# Patient Record
Sex: MALE | Race: WHITE | Employment: STUDENT | URBAN - METROPOLITAN AREA
[De-identification: names, ages, dates, MRNs, and addresses within clinical notes are randomized per-mention and may not be internally consistent; named-entity substitution may affect disease eponyms.]

---

## 2018-02-25 ENCOUNTER — HOSPITAL ENCOUNTER (EMERGENCY)
Age: 23
Discharge: HOME | End: 2018-02-26
Attending: EMERGENCY MEDICINE
Payer: COMMERCIAL

## 2018-02-25 DIAGNOSIS — T44.5X1A ACCIDENTAL INJECTION OF EPINEPHRINE, INITIAL ENCOUNTER: ICD-10-CM

## 2018-02-25 DIAGNOSIS — T78.40XA ACUTE ALLERGIC REACTION, INITIAL ENCOUNTER: Primary | ICD-10-CM

## 2018-02-25 PROCEDURE — 99283 EMERGENCY DEPT VISIT LOW MDM: CPT

## 2018-02-25 PROCEDURE — 6370000000 HC RX 637 (ALT 250 FOR IP): Performed by: EMERGENCY MEDICINE

## 2018-02-25 PROCEDURE — 6360000002 HC RX W HCPCS: Performed by: EMERGENCY MEDICINE

## 2018-02-25 RX ORDER — ONDANSETRON 4 MG/1
4 TABLET, ORALLY DISINTEGRATING ORAL ONCE
Status: COMPLETED | OUTPATIENT
Start: 2018-02-25 | End: 2018-02-25

## 2018-02-25 RX ORDER — ONDANSETRON 4 MG/1
4 TABLET, ORALLY DISINTEGRATING ORAL ONCE
Status: DISCONTINUED | OUTPATIENT
Start: 2018-02-25 | End: 2018-02-25

## 2018-02-25 RX ORDER — EPINEPHRINE 0.3 MG/.3ML
0.3 INJECTION SUBCUTANEOUS ONCE
Qty: 0.3 ML | Refills: 2 | Status: SHIPPED | OUTPATIENT
Start: 2018-02-26 | End: 2021-10-15

## 2018-02-25 RX ADMIN — ONDANSETRON 4 MG: 4 TABLET, ORALLY DISINTEGRATING ORAL at 23:49

## 2018-02-25 RX ADMIN — NITROGLYCERIN 0.5 INCH: 20 OINTMENT TOPICAL at 23:49

## 2018-02-25 RX ADMIN — ONDANSETRON 4 MG: 4 TABLET, ORALLY DISINTEGRATING ORAL at 21:33

## 2018-02-25 ASSESSMENT — ENCOUNTER SYMPTOMS
VOMITING: 0
ABDOMINAL PAIN: 0
SORE THROAT: 0
NAUSEA: 0
DIARRHEA: 0
SHORTNESS OF BREATH: 0
BACK PAIN: 0

## 2018-02-26 VITALS
BODY MASS INDEX: 23.49 KG/M2 | SYSTOLIC BLOOD PRESSURE: 117 MMHG | HEIGHT: 68 IN | OXYGEN SATURATION: 100 % | RESPIRATION RATE: 18 BRPM | WEIGHT: 155 LBS | TEMPERATURE: 98.6 F | DIASTOLIC BLOOD PRESSURE: 77 MMHG | HEART RATE: 78 BPM

## 2018-02-26 RX ORDER — ONDANSETRON 4 MG/1
4 TABLET, ORALLY DISINTEGRATING ORAL EVERY 8 HOURS PRN
Qty: 12 TABLET | Refills: 0 | Status: SHIPPED | OUTPATIENT
Start: 2018-02-26 | End: 2022-03-29

## 2018-02-26 NOTE — ED PROVIDER NOTES
[address] [place] [address] ED  eMERGENCY dEPARTMENT eNCOUnter      Pt Name: Sabrina Thomas  MRN: [id number]  [place] 1995  Date of evaluation: 2/25/2018  Provider: Allie Martino MD     CHIEF COMPLAINT       Chief Complaint   Patient presents with    Allergic Reaction       HISTORY OF PRESENT ILLNESS   (Location/Symptom, Timing/Onset, Context/Setting, Quality, Duration, Modifying Factors, Severity) Note limiting factors. 65-year-old male with a known peanut allergy presents after accidentally exposure to peanuts. He was eating a cookie that he thought was nut free but unfortunately it did contain peanuts. He felt almost immediately short of breath and had throat tightness. He self-administered his EpiPen and his symptoms completely resolved prior to paramedic arrival.  No complaints on arrival except for mild nausea which she states is not unusual when he has an allergic reaction. He denies any shortness of breath or trouble swallowing at this time. Of note, he did accidentally injected the epinephrine into his right thumb when he was taking the cap off of the device. He is right-hand dominant. He denies paresthesias or coldness in the thumb. Nursing Notes were reviewed. REVIEW OF SYSTEMS    (2+ for level 4; 10+ for level 5)     Review of Systems   Constitutional: Negative for chills and fever. HENT: Negative for sore throat. Eyes: Negative for visual disturbance. Respiratory: Negative for shortness of breath. Cardiovascular: Negative for chest pain. Gastrointestinal: Negative for abdominal pain, diarrhea, nausea and vomiting. Endocrine: Negative for polyuria. Genitourinary: Negative for dysuria. Musculoskeletal: Negative for back pain and neck pain. Skin: Negative for rash. Neurological: Negative for dizziness, weakness and headaches. Hematological: Negative for adenopathy. Psychiatric/Behavioral: Negative for confusion.    All other systems reviewed and are comments: In terms of his allergic reaction, his symptoms have resolved but I will observe him for several hours in case he has recurrence. I will also refill his EpiPen. In terms of his accidental injection of the EpiPen into his thumb, he really has no symptoms related to it at this time. I did have him soak his thumb and hand in a basin of fairly warm water and we continue to refill it with warm water and also use warm blankets for several hours. He continues to have no symptoms. Nitroglycerin was applied after discussion with him and he was observed further. He is still asymptomatic. He has cap refill of less than 3 seconds on several serial examinations. I told him that there is still a chance of complications however given his well appearance, the likelihood is low. He is very reliable and wants to go home. He assures me that he will continue to warm his finger at home using a heating pad and come back if he has any symptoms. CRITICAL CARE TIME     Total Critical Care time (not applicable if blank)      Total minutes, excluding separately reportable procedures. There was a high probability of clinically significant/life threatening deterioration in the patient's condition which required my urgent intervention. This includes discussing the case with consultants, reviewing laboratory studies and images independently, arranging disposition, and speaking with patient/family    CONSULTS:  None    PROCEDURES:  Unless otherwise noted below, none     Procedures    FINAL IMPRESSION      1. Acute allergic reaction, initial encounter    2. Accidental injection of epinephrine, initial encounter          DISPOSITION/PLAN   DISPOSITION Decision To Discharge 02/25/2018 11:34:19 PM      PATIENT REFERRED TO:  No follow-up provider specified.     DISCHARGE MEDICATIONS:  Current Discharge Medication List             (Please note that portions of this note were completed with a voice recognition program.

## 2019-07-06 ENCOUNTER — HOSPITAL ENCOUNTER (EMERGENCY)
Age: 24
Discharge: HOME | End: 2019-07-06
Payer: COMMERCIAL

## 2019-07-06 VITALS
OXYGEN SATURATION: 98 % | SYSTOLIC BLOOD PRESSURE: 132 MMHG | RESPIRATION RATE: 14 BRPM | HEIGHT: 68 IN | TEMPERATURE: 98.3 F | BODY MASS INDEX: 23.49 KG/M2 | WEIGHT: 155 LBS | HEART RATE: 72 BPM | DIASTOLIC BLOOD PRESSURE: 76 MMHG

## 2019-07-06 DIAGNOSIS — L03.213 PRESEPTAL CELLULITIS OF RIGHT EYE: Primary | ICD-10-CM

## 2019-07-06 PROCEDURE — 2500000003 HC RX 250 WO HCPCS: Performed by: PHYSICIAN ASSISTANT

## 2019-07-06 PROCEDURE — 96365 THER/PROPH/DIAG IV INF INIT: CPT

## 2019-07-06 PROCEDURE — 2580000003 HC RX 258

## 2019-07-06 PROCEDURE — 99282 EMERGENCY DEPT VISIT SF MDM: CPT

## 2019-07-06 RX ORDER — CLINDAMYCIN HYDROCHLORIDE 150 MG/1
450 CAPSULE ORAL 3 TIMES DAILY
Qty: 63 CAPSULE | Refills: 0 | Status: SHIPPED | OUTPATIENT
Start: 2019-07-06 | End: 2019-07-13

## 2019-07-06 RX ORDER — CLINDAMYCIN PHOSPHATE 600 MG/50ML
600 INJECTION INTRAVENOUS ONCE
Status: COMPLETED | OUTPATIENT
Start: 2019-07-06 | End: 2019-07-06

## 2019-07-06 RX ORDER — NAPROXEN 500 MG/1
500 TABLET ORAL 2 TIMES DAILY
Qty: 60 TABLET | Refills: 0 | Status: SHIPPED | OUTPATIENT
Start: 2019-07-06 | End: 2022-03-29

## 2019-07-06 RX ORDER — SODIUM CHLORIDE 0.9 % (FLUSH) 0.9 %
SYRINGE (ML) INJECTION
Status: COMPLETED
Start: 2019-07-06 | End: 2019-07-06

## 2019-07-06 RX ADMIN — CLINDAMYCIN PHOSPHATE 600 MG: 600 INJECTION, SOLUTION INTRAVENOUS at 11:21

## 2019-07-06 RX ADMIN — Medication 10 ML: at 11:21

## 2019-07-06 ASSESSMENT — PAIN DESCRIPTION - PROGRESSION: CLINICAL_PROGRESSION: GRADUALLY WORSENING

## 2019-07-06 ASSESSMENT — PAIN DESCRIPTION - ORIENTATION: ORIENTATION: RIGHT

## 2019-07-06 ASSESSMENT — PAIN DESCRIPTION - DESCRIPTORS: DESCRIPTORS: THROBBING

## 2019-07-06 ASSESSMENT — PAIN DESCRIPTION - PAIN TYPE: TYPE: ACUTE PAIN

## 2019-07-06 ASSESSMENT — PAIN DESCRIPTION - FREQUENCY: FREQUENCY: CONTINUOUS

## 2019-07-06 ASSESSMENT — PAIN DESCRIPTION - LOCATION: LOCATION: EYE

## 2019-07-06 NOTE — ED PROVIDER NOTES
pain or swelling. He was also advised to return if he develops pain with eye movement or vision changes. He was educated on signs and symptoms of infection that would require emergent return to the ED. He was educated on importance of antibiotic use and finishing the whole prescription as long as he has no adverse side effects. He will follow-up with his family doctor on Monday. I discussed the differential, results and discharge plan with the patient and/or family/friend/caregiver if present. I emphasized the importance of follow-up with the physician I referred them to in the timeframe recommended. I explained reasons for the patient to return to the Emergency Department. Additional verbal discharge instructions were also given and discussed with the patient to supplement those generated by the EMR. We also discussed medications that were prescribed (if any) including common side effects and interactions. The patient was advised to abstain from driving, operating heavy machinery or making significant decisions while taking medications such as opiates and muscle relaxers that may impair this. All questions were addressed. They understand return precautions and discharge instructions. The patient and/or family/friend/caregiver expressed understanding. Vitals were stable and they were in no distress at discharge. Ice to right eye if needed for swelling. Patient also advised to try to keep his head elevated during sleep to help with swelling. Assessment      1. Preseptal cellulitis of right eye      This patient's ED course included: a personal history and physicial examination and IV medications  This patient has remained hemodynamically stable during their ED course. Plan   Discharge to home. Patient condition is good.     New Medications     Discharge Medication List as of 7/6/2019 11:07 AM      START taking these medications    Details   clindamycin (CLEOCIN) 150 MG capsule Take 3 capsules by mouth 3 times daily for 7 days, Disp-63 capsule, R-0Print      naproxen (NAPROSYN) 500 MG tablet Take 1 tablet by mouth 2 times daily, Disp-60 tablet, R-0Print           Electronically signed by Donnie Sam PA-C   DD: 7/6/19  **This report was transcribed using voice recognition software. Every effort was made to ensure accuracy; however, inadvertent computerized transcription errors may be present.     END OF PROVIDER NOTE        Donnie Sam PA-C  07/06/19 4907

## 2020-07-08 ENCOUNTER — APPOINTMENT (OUTPATIENT)
Dept: CT IMAGING | Age: 25
End: 2020-07-08
Payer: COMMERCIAL

## 2020-07-08 ENCOUNTER — APPOINTMENT (OUTPATIENT)
Dept: GENERAL RADIOLOGY | Age: 25
End: 2020-07-08
Payer: COMMERCIAL

## 2020-07-08 ENCOUNTER — HOSPITAL ENCOUNTER (EMERGENCY)
Age: 25
Discharge: HOME | End: 2020-07-08
Attending: EMERGENCY MEDICINE
Payer: COMMERCIAL

## 2020-07-08 VITALS
WEIGHT: 145 LBS | SYSTOLIC BLOOD PRESSURE: 120 MMHG | HEIGHT: 68 IN | RESPIRATION RATE: 18 BRPM | BODY MASS INDEX: 21.98 KG/M2 | HEART RATE: 68 BPM | OXYGEN SATURATION: 98 % | TEMPERATURE: 98.5 F | DIASTOLIC BLOOD PRESSURE: 79 MMHG

## 2020-07-08 LAB
ABO/RH: NORMAL
ANION GAP SERPL CALCULATED.3IONS-SCNC: 15 MEQ/L (ref 9–15)
ANTIBODY SCREEN: NORMAL
APTT: 33.1 SEC (ref 24.4–36.8)
BASOPHILS ABSOLUTE: 0.1 K/UL (ref 0–0.2)
BASOPHILS RELATIVE PERCENT: 1.3 %
BUN BLDV-MCNC: 9 MG/DL (ref 6–20)
CALCIUM SERPL-MCNC: 9.5 MG/DL (ref 8.5–9.9)
CHLORIDE BLD-SCNC: 102 MEQ/L (ref 95–107)
CO2: 24 MEQ/L (ref 20–31)
CREAT SERPL-MCNC: 0.77 MG/DL (ref 0.7–1.2)
EOSINOPHILS ABSOLUTE: 0.3 K/UL (ref 0–0.7)
EOSINOPHILS RELATIVE PERCENT: 4.2 %
ETHANOL PERCENT: 0.08 G/DL
ETHANOL: 88 MG/DL (ref 0–0.08)
GFR AFRICAN AMERICAN: >60
GFR NON-AFRICAN AMERICAN: >60
GLUCOSE BLD-MCNC: 105 MG/DL (ref 70–99)
HCT VFR BLD CALC: 49.8 % (ref 42–52)
HEMOGLOBIN: 16.6 G/DL (ref 14–18)
INR BLD: 1.1
LYMPHOCYTES ABSOLUTE: 3.4 K/UL (ref 1–4.8)
LYMPHOCYTES RELATIVE PERCENT: 44.3 %
MCH RBC QN AUTO: 31.2 PG (ref 27–31.3)
MCHC RBC AUTO-ENTMCNC: 33.3 % (ref 33–37)
MCV RBC AUTO: 93.6 FL (ref 80–100)
MONOCYTES ABSOLUTE: 0.7 K/UL (ref 0.2–0.8)
MONOCYTES RELATIVE PERCENT: 8.5 %
NEUTROPHILS ABSOLUTE: 3.2 K/UL (ref 1.4–6.5)
NEUTROPHILS RELATIVE PERCENT: 41.7 %
PDW BLD-RTO: 13.8 % (ref 11.5–14.5)
PLATELET # BLD: 290 K/UL (ref 130–400)
POTASSIUM SERPL-SCNC: 4 MEQ/L (ref 3.4–4.9)
PROTHROMBIN TIME: 13.9 SEC (ref 12.3–14.9)
RBC # BLD: 5.32 M/UL (ref 4.7–6.1)
SODIUM BLD-SCNC: 141 MEQ/L (ref 135–144)
WBC # BLD: 7.7 K/UL (ref 4.8–10.8)

## 2020-07-08 PROCEDURE — 80048 BASIC METABOLIC PNL TOTAL CA: CPT

## 2020-07-08 PROCEDURE — 85025 COMPLETE CBC W/AUTO DIFF WBC: CPT

## 2020-07-08 PROCEDURE — 86900 BLOOD TYPING SEROLOGIC ABO: CPT

## 2020-07-08 PROCEDURE — 29125 APPL SHORT ARM SPLINT STATIC: CPT

## 2020-07-08 PROCEDURE — 85610 PROTHROMBIN TIME: CPT

## 2020-07-08 PROCEDURE — 73110 X-RAY EXAM OF WRIST: CPT

## 2020-07-08 PROCEDURE — 86901 BLOOD TYPING SEROLOGIC RH(D): CPT

## 2020-07-08 PROCEDURE — 6360000002 HC RX W HCPCS: Performed by: PERSONAL EMERGENCY RESPONSE ATTENDANT

## 2020-07-08 PROCEDURE — 6830039000 HC L3 TRAUMA ALERT

## 2020-07-08 PROCEDURE — G0480 DRUG TEST DEF 1-7 CLASSES: HCPCS

## 2020-07-08 PROCEDURE — 96374 THER/PROPH/DIAG INJ IV PUSH: CPT

## 2020-07-08 PROCEDURE — 86850 RBC ANTIBODY SCREEN: CPT

## 2020-07-08 PROCEDURE — 99284 EMERGENCY DEPT VISIT MOD MDM: CPT

## 2020-07-08 PROCEDURE — 73552 X-RAY EXAM OF FEMUR 2/>: CPT

## 2020-07-08 PROCEDURE — 70450 CT HEAD/BRAIN W/O DYE: CPT

## 2020-07-08 PROCEDURE — 96375 TX/PRO/DX INJ NEW DRUG ADDON: CPT

## 2020-07-08 PROCEDURE — 85730 THROMBOPLASTIN TIME PARTIAL: CPT

## 2020-07-08 PROCEDURE — 36415 COLL VENOUS BLD VENIPUNCTURE: CPT

## 2020-07-08 RX ORDER — FENTANYL CITRATE 50 UG/ML
50 INJECTION, SOLUTION INTRAMUSCULAR; INTRAVENOUS ONCE
Status: COMPLETED | OUTPATIENT
Start: 2020-07-08 | End: 2020-07-08

## 2020-07-08 RX ORDER — ONDANSETRON 2 MG/ML
4 INJECTION INTRAMUSCULAR; INTRAVENOUS ONCE
Status: COMPLETED | OUTPATIENT
Start: 2020-07-08 | End: 2020-07-08

## 2020-07-08 RX ORDER — IBUPROFEN 800 MG/1
800 TABLET ORAL EVERY 8 HOURS PRN
Qty: 30 TABLET | Refills: 0 | Status: SHIPPED | OUTPATIENT
Start: 2020-07-08 | End: 2022-03-29

## 2020-07-08 RX ORDER — HYDROCODONE BITARTRATE AND ACETAMINOPHEN 5; 325 MG/1; MG/1
1-2 TABLET ORAL EVERY 6 HOURS PRN
Qty: 10 TABLET | Refills: 0 | Status: SHIPPED | OUTPATIENT
Start: 2020-07-08 | End: 2020-07-15

## 2020-07-08 RX ADMIN — ONDANSETRON 4 MG: 2 INJECTION INTRAMUSCULAR; INTRAVENOUS at 01:58

## 2020-07-08 RX ADMIN — FENTANYL CITRATE 50 MCG: 50 INJECTION, SOLUTION INTRAMUSCULAR; INTRAVENOUS at 01:58

## 2020-07-08 ASSESSMENT — PAIN DESCRIPTION - ORIENTATION: ORIENTATION: RIGHT

## 2020-07-08 ASSESSMENT — PAIN DESCRIPTION - PAIN TYPE: TYPE: ACUTE PAIN

## 2020-07-08 ASSESSMENT — PAIN DESCRIPTION - LOCATION: LOCATION: WRIST

## 2020-07-08 ASSESSMENT — ENCOUNTER SYMPTOMS
SHORTNESS OF BREATH: 0
BLOOD IN STOOL: 0
RHINORRHEA: 0
ABDOMINAL PAIN: 0
SORE THROAT: 0
COUGH: 0
NAUSEA: 0
VOMITING: 0
DIARRHEA: 0
COLOR CHANGE: 0

## 2020-07-08 ASSESSMENT — PAIN DESCRIPTION - FREQUENCY: FREQUENCY: CONTINUOUS

## 2020-07-08 ASSESSMENT — PAIN SCALES - GENERAL
PAINLEVEL_OUTOF10: 3
PAINLEVEL_OUTOF10: 5
PAINLEVEL_OUTOF10: 5

## 2020-07-08 ASSESSMENT — PAIN DESCRIPTION - PROGRESSION: CLINICAL_PROGRESSION: GRADUALLY IMPROVING

## 2020-07-08 ASSESSMENT — PAIN DESCRIPTION - DESCRIPTORS: DESCRIPTORS: ACHING;SHARP

## 2020-07-08 NOTE — ED NOTES
Patient given discharge instructions and prescriptions and verbalized understanding. Vital signs stable. Resp even and unlabored. Skin warm, dry and intact. Patient is alert and oriented. IV removed. Patient doesn't have any questions at this time.         Agusto Stevens RN  07/08/20 2895

## 2020-07-08 NOTE — ED NOTES
Patient returned to room from CT. Patient resting in bed with call light in reach. Breathes are even and unlabored. Skin is warm and dry. Vital signs are stable. Patient denies any needs at this time.       Arun Carlisle RN  07/08/20 0907
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
## 2020-07-08 NOTE — ED NOTES
Sugar tong splint applied to right arm with assistance from Dolly Holter, RN. Patient tolerated well.       Titus Hancock RN  07/08/20 4538

## 2020-07-09 ENCOUNTER — OFFICE VISIT (OUTPATIENT)
Dept: ORTHOPEDIC SURGERY | Age: 25
End: 2020-07-09
Payer: COMMERCIAL

## 2020-07-09 VITALS — HEIGHT: 68 IN | BODY MASS INDEX: 21.98 KG/M2 | TEMPERATURE: 96.2 F | WEIGHT: 145 LBS

## 2020-07-09 PROBLEM — S52.531A FRACTURE, COLLES, RIGHT, CLOSED: Status: ACTIVE | Noted: 2020-07-09

## 2020-07-09 PROCEDURE — 99202 OFFICE O/P NEW SF 15 MIN: CPT | Performed by: ORTHOPAEDIC SURGERY

## 2020-07-09 PROCEDURE — 25600 CLTX DST RDL FX/EPHYS SEP WO: CPT | Performed by: ORTHOPAEDIC SURGERY
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
## 2020-07-30 ENCOUNTER — NURSE ONLY (OUTPATIENT)
Dept: ORTHOPEDIC SURGERY | Age: 25
End: 2020-07-30

## 2020-07-30 NOTE — PROGRESS NOTES
Patient came in to office with really loose cast. Cast removed without difficulty. Patient placed in new short arm cast without difficulty. Cast care explained. Patient has a f/u with Dr. Fernando Channel 08/14.

## 2020-08-13 ENCOUNTER — OFFICE VISIT (OUTPATIENT)
Dept: ORTHOPEDIC SURGERY | Age: 25
End: 2020-08-13
Payer: COMMERCIAL

## 2020-08-13 ENCOUNTER — HOSPITAL ENCOUNTER (OUTPATIENT)
Dept: ORTHOPEDIC SURGERY | Age: 25
Discharge: HOME | End: 2020-08-15
Payer: COMMERCIAL

## 2020-08-13 VITALS
TEMPERATURE: 97.2 F | HEIGHT: 68 IN | WEIGHT: 145 LBS | BODY MASS INDEX: 21.98 KG/M2 | OXYGEN SATURATION: 97 % | HEART RATE: 76 BPM

## 2020-08-13 PROCEDURE — 73110 X-RAY EXAM OF WRIST: CPT | Performed by: ORTHOPAEDIC SURGERY

## 2020-08-13 PROCEDURE — L3908 WHO COCK-UP NONMOLDE PRE OTS: HCPCS | Performed by: PHYSICIAN ASSISTANT

## 2020-08-13 PROCEDURE — 73110 X-RAY EXAM OF WRIST: CPT

## 2020-08-13 PROCEDURE — 99024 POSTOP FOLLOW-UP VISIT: CPT | Performed by: PHYSICIAN ASSISTANT

## 2020-08-13 ASSESSMENT — ENCOUNTER SYMPTOMS
CONSTIPATION: 0
COLOR CHANGE: 0
DIARRHEA: 0
NAUSEA: 0
BACK PAIN: 0
VOMITING: 0
STRIDOR: 0
SHORTNESS OF BREATH: 0
WHEEZING: 0

## 2020-08-13 NOTE — PROGRESS NOTES
[facility]      Subjective:      Chief Complaint   Patient presents with    Follow-up      [facility] F/U FOR Closed Colles' fracture of right radius, xrays out of cast in office today; pt says that he has a no pain but does experience some tenderness if he turns it a certain way       HPI: Kelly Runner is a right-hand-dominant 27 y.o. male who is about a month after suffering distal radial fracture of the right wrist.  She was in a cast which was removed for x-ray to assess healing. No residual numbness, however there is some stiffness. Past Medical History:   Diagnosis Date    Asthma       History reviewed. No pertinent surgical history.   Social History     Socioeconomic History    Marital status: Single     Spouse name: Not on file    Number of children: Not on file    Years of education: Not on file    Highest education level: Not on file   Occupational History    Not on file   Social Needs    Financial resource strain: Not on file    Food insecurity     Worry: Not on file     Inability: Not on file    Transportation needs     Medical: Not on file     Non-medical: Not on file   Tobacco Use    Smoking status: Never Smoker    Smokeless tobacco: Never Used   Substance and Sexual Activity    Alcohol use: No    Drug use: Yes     Types: Marijuana     Comment: social    Sexual activity: Not on file   Lifestyle    Physical activity     Days per week: Not on file     Minutes per session: Not on file    Stress: Not on file   Relationships    Social connections     Talks on phone: Not on file     Gets together: Not on file     Attends Orthodox service: Not on file     Active member of club or organization: Not on file     Attends meetings of clubs or organizations: Not on file     Relationship status: Not on file    Intimate partner violence     Fear of current or ex partner: Not on file     Emotionally abused: Not on file     Physically abused: Not on file     Forced sexual activity: Not on file   Other Topics Concern    Not on file   Social History Narrative    Not on file     History reviewed. No pertinent family history. Allergies   Allergen Reactions    Nut [Peanut-Containing Drug Products]      Current Outpatient Medications on File Prior to Visit   Medication Sig Dispense Refill    ibuprofen (ADVIL;MOTRIN) 800 MG tablet Take 1 tablet by mouth every 8 hours as needed for Pain 30 tablet 0    naproxen (NAPROSYN) 500 MG tablet Take 1 tablet by mouth 2 times daily 60 tablet 0    ondansetron (ZOFRAN ODT) 4 MG disintegrating tablet Take 1 tablet by mouth every 8 hours as needed for Nausea or Vomiting 12 tablet 0    EPINEPHrine (EPIPEN 2-NANCY) 0.3 MG/0.3ML SOAJ injection Inject 0.3 mLs into the muscle once for 1 dose Use as directed for allergic reaction 0.3 mL 2    montelukast (SINGULAIR) 10 MG tablet Take 10 mg by mouth nightly      albuterol sulfate  (90 BASE) MCG/ACT inhaler Inhale 2 puffs into the lungs every 6 hours as needed for Wheezing       No current facility-administered medications on file prior to visit. Review of Systems   Constitutional: Negative for appetite change and fever. Respiratory: Negative for shortness of breath, wheezing and stridor. Cardiovascular: Negative for chest pain and palpitations. Gastrointestinal: Negative for constipation, diarrhea, nausea and vomiting. Musculoskeletal: Negative for arthralgias, back pain, joint swelling, myalgias and neck pain. Skin: Negative for color change and rash. Neurological: Negative for dizziness, speech difficulty, weakness and light-headedness.        Objective:   Pulse 76   Temp 97.2 °F (36.2 °C) (Temporal)   Ht 5' 8\" (1.727 m)   Wt 145 lb (65.8 kg)   SpO2 97%   BMI 22.05 kg/m²     General: WDWN, well appearing, in no distress   Skin: without breakdown, rash, normal in color    Right Hand Exam   Right hand exam is normal.    Tenderness   The patient is experiencing no tenderness. Range of Motion   The patient has normal right wrist ROM. Muscle Strength   The patient has normal right wrist strength. Tests   Phalens Sign: negative  Tinel's sign (median nerve): negative  Finkelstein's test: negative    Other   Erythema: absent  Scars: absent  Sensation: normal  Pulse: present    Comments:  No residual stiffness      Left Hand Exam   Left hand exam is normal.    Tenderness   The patient is experiencing no tenderness. Range of Motion   The patient has normal left wrist ROM. Muscle Strength   The patient has normal left wrist strength. Tests   Phalens Sign: negative  Tinel's sign (median nerve): negative  Finkelstein's test: negative    Other   Erythema: absent  Scars: absent  Sensation: normal  Pulse: present              Radiographs and Laboratory Studies:     Diagnostic Imaging Studies:    Xr Wrist Right (min 3 Views)    Result Date: 8/13/2020  AP lateral and some oblique views taken of the right distal radius show the fracture fragments have not moved the fracture line itself has not widened and there is no visible sign of collapse mall angulation or displacement. Laboratory Studies:   Lab Results   Component Value Date    WBC 7.7 07/08/2020    HGB 16.6 07/08/2020    HCT 49.8 07/08/2020    MCV 93.6 07/08/2020     07/08/2020     No results found for: SEDRATE  No results found for: CRP    Assessment and Plan:      Diagnosis Orders   1. Closed Colles' fracture of right radius with routine healing, subsequent encounter  XR WRIST RIGHT (MIN 3 VIEWS)    XR WRIST RIGHT (MIN 3 VIEWS)     Very minimal residual stiffness. There is no pain to palpation over the distal radius. He was offered an XO's brace is that he cannot pay for it, he will get a cock-up brace for that. He is instructed to wear it in a most times especially with activity not to lift anything incredibly heavy.   Slowly wean herself out of the brace but time we see you next in a month, we will get an x-ray then to     The above plan was discussed in thorough detail with Dr. Nick Avitia and the patient. Orders Placed This Encounter   Procedures    XR WRIST RIGHT (MIN 3 VIEWS)     Standing Status:   Future     Standing Expiration Date:   8/13/2021    XR WRIST RIGHT (MIN 3 VIEWS)     Standing Status:   Future     Standing Expiration Date:   8/13/2021     No orders of the defined types were placed in this encounter. Return in about 4 weeks (around 9/10/2020), or colles fx f/u, for Evaluate fracture.     Tai Pretty PA-C  [facility] [id number] [facility]  [phone]

## 2021-10-15 ENCOUNTER — HOSPITAL ENCOUNTER (EMERGENCY)
Age: 26
Discharge: HOME | End: 2021-10-15
Attending: EMERGENCY MEDICINE
Payer: COMMERCIAL

## 2021-10-15 VITALS
HEIGHT: 68 IN | TEMPERATURE: 98.7 F | OXYGEN SATURATION: 99 % | RESPIRATION RATE: 17 BRPM | HEART RATE: 82 BPM | WEIGHT: 155 LBS | DIASTOLIC BLOOD PRESSURE: 62 MMHG | SYSTOLIC BLOOD PRESSURE: 109 MMHG | BODY MASS INDEX: 23.49 KG/M2

## 2021-10-15 DIAGNOSIS — T78.2XXA ANAPHYLAXIS, INITIAL ENCOUNTER: Primary | ICD-10-CM

## 2021-10-15 PROCEDURE — 99284 EMERGENCY DEPT VISIT MOD MDM: CPT

## 2021-10-15 PROCEDURE — 6360000002 HC RX W HCPCS: Performed by: EMERGENCY MEDICINE

## 2021-10-15 PROCEDURE — 96375 TX/PRO/DX INJ NEW DRUG ADDON: CPT

## 2021-10-15 PROCEDURE — 96374 THER/PROPH/DIAG INJ IV PUSH: CPT

## 2021-10-15 PROCEDURE — 2580000003 HC RX 258: Performed by: EMERGENCY MEDICINE

## 2021-10-15 PROCEDURE — 2500000003 HC RX 250 WO HCPCS: Performed by: EMERGENCY MEDICINE

## 2021-10-15 RX ORDER — DIPHENHYDRAMINE HCL 25 MG
50 CAPSULE ORAL EVERY 6 HOURS PRN
Qty: 20 CAPSULE | Refills: 0 | Status: SHIPPED | OUTPATIENT
Start: 2021-10-15 | End: 2022-03-29 | Stop reason: SDUPTHER

## 2021-10-15 RX ORDER — 0.9 % SODIUM CHLORIDE 0.9 %
1000 INTRAVENOUS SOLUTION INTRAVENOUS ONCE
Status: COMPLETED | OUTPATIENT
Start: 2021-10-15 | End: 2021-10-15

## 2021-10-15 RX ORDER — METHYLPREDNISOLONE SODIUM SUCCINATE 125 MG/2ML
125 INJECTION, POWDER, LYOPHILIZED, FOR SOLUTION INTRAMUSCULAR; INTRAVENOUS ONCE
Status: COMPLETED | OUTPATIENT
Start: 2021-10-15 | End: 2021-10-15

## 2021-10-15 RX ORDER — PREDNISONE 20 MG/1
40 TABLET ORAL DAILY
Qty: 10 TABLET | Refills: 0 | Status: SHIPPED | OUTPATIENT
Start: 2021-10-15 | End: 2021-10-20

## 2021-10-15 RX ORDER — DIPHENHYDRAMINE HYDROCHLORIDE 50 MG/ML
50 INJECTION INTRAMUSCULAR; INTRAVENOUS ONCE
Status: COMPLETED | OUTPATIENT
Start: 2021-10-15 | End: 2021-10-15

## 2021-10-15 RX ORDER — EPINEPHRINE 0.3 MG/.3ML
0.3 INJECTION SUBCUTANEOUS ONCE
Qty: 1 EACH | Refills: 1 | Status: SHIPPED | OUTPATIENT
Start: 2021-10-15 | End: 2021-10-15

## 2021-10-15 RX ORDER — SODIUM CHLORIDE 0.9 % (FLUSH) 0.9 %
3 SYRINGE (ML) INJECTION EVERY 8 HOURS
Status: DISCONTINUED | OUTPATIENT
Start: 2021-10-15 | End: 2021-10-15 | Stop reason: HOSPADM

## 2021-10-15 RX ADMIN — DIPHENHYDRAMINE HYDROCHLORIDE 50 MG: 50 INJECTION INTRAMUSCULAR; INTRAVENOUS at 19:39

## 2021-10-15 RX ADMIN — Medication 3 ML: at 19:40

## 2021-10-15 RX ADMIN — SODIUM CHLORIDE 1000 ML: 9 INJECTION, SOLUTION INTRAVENOUS at 19:38

## 2021-10-15 RX ADMIN — METHYLPREDNISOLONE SODIUM SUCCINATE 125 MG: 125 INJECTION, POWDER, FOR SOLUTION INTRAMUSCULAR; INTRAVENOUS at 19:38

## 2021-10-15 RX ADMIN — FAMOTIDINE 20 MG: 10 INJECTION, SOLUTION INTRAVENOUS at 19:39

## 2021-10-15 ASSESSMENT — ENCOUNTER SYMPTOMS
FACIAL SWELLING: 0
VOMITING: 0
BACK PAIN: 0
STRIDOR: 0
ABDOMINAL PAIN: 0
COUGH: 0
EYE REDNESS: 0
SORE THROAT: 0
EYE PAIN: 0
SHORTNESS OF BREATH: 0
EYE DISCHARGE: 0
SINUS PRESSURE: 0
CHOKING: 0
BLOOD IN STOOL: 0
VOICE CHANGE: 0
TROUBLE SWALLOWING: 0
CONSTIPATION: 0
WHEEZING: 0
DIARRHEA: 0
CHEST TIGHTNESS: 0

## 2021-10-15 ASSESSMENT — PAIN DESCRIPTION - LOCATION: LOCATION: ABDOMEN

## 2021-10-15 ASSESSMENT — PAIN DESCRIPTION - FREQUENCY: FREQUENCY: CONTINUOUS

## 2021-10-15 ASSESSMENT — PAIN SCALES - GENERAL: PAINLEVEL_OUTOF10: 2

## 2021-10-15 ASSESSMENT — PAIN DESCRIPTION - DESCRIPTORS: DESCRIPTORS: DISCOMFORT

## 2021-10-15 ASSESSMENT — PAIN DESCRIPTION - PAIN TYPE: TYPE: ACUTE PAIN

## 2021-10-15 ASSESSMENT — PAIN DESCRIPTION - ONSET: ONSET: SUDDEN

## 2021-10-15 NOTE — ED PROVIDER NOTES
[address] [place] [address] ED  eMERGENCY dEPARTMENT eNCOUnter      Pt Name: Chet Pedro  MRN: [id number]  [place] 1995  Date of evaluation: 10/15/2021  Provider: Joann Reece MD    [address] [place] [address]       Chief Complaint   Patient presents with    Allergic Reaction     used 2 epi pens PTA         HISTORY OF PRESENT ILLNESS   (Location/Symptom, Timing/Onset,Context/Setting, Quality, Duration, Modifying Factors, Severity)  Note limiting factors. Chet Pedro is a 27 y.o. male who presents to the emergency department patient with a history of peanut allergy accidentally he has food containing peanuts in the [employer] and immediately break out into hives all over the body patient immediately took EpiPen with minimal relief came here no swelling of the tongue or lips no short of breath itching and rash all over the body    HPI    NursingNotes were reviewed. REVIEW OF SYSTEMS    (2-9 systems for level 4, 10 or more for level 5)     Review of Systems   Constitutional: Negative. Negative for activity change and fever. HENT: Negative for congestion, drooling, facial swelling, mouth sores, nosebleeds, sinus pressure, sore throat, trouble swallowing and voice change. Eyes: Negative for pain, discharge, redness and visual disturbance. Respiratory: Negative for cough, choking, chest tightness, shortness of breath, wheezing and stridor. Cardiovascular: Negative for chest pain, palpitations and leg swelling. Gastrointestinal: Negative for abdominal pain, blood in stool, constipation, diarrhea and vomiting. Endocrine: Negative for cold intolerance, polyphagia and polyuria. Genitourinary: Negative for dysuria, flank pain, frequency, genital sores and urgency. Musculoskeletal: Negative for back pain, joint swelling, neck pain and neck stiffness. Skin: Positive for rash. Negative for pallor. Neurological: Negative for tremors, seizures, syncope, weakness, numbness and headaches.    Hematological: Negative for adenopathy. Does not bruise/bleed easily. Psychiatric/Behavioral: Negative for agitation, behavioral problems, hallucinations and sleep disturbance. The patient is nervous/anxious. The patient is not hyperactive. All other systems reviewed and are negative. Except as noted above the remainder of the review of systems was reviewed and negative. PAST MEDICAL HISTORY     Past Medical History:   Diagnosis Date    Asthma          SURGICALHISTORY     History reviewed. No pertinent surgical history. CURRENT MEDICATIONS       Previous Medications    ALBUTEROL SULFATE  (90 BASE) MCG/ACT INHALER    Inhale 2 puffs into the lungs every 6 hours as needed for Wheezing    IBUPROFEN (ADVIL;MOTRIN) 800 MG TABLET    Take 1 tablet by mouth every 8 hours as needed for Pain    MONTELUKAST (SINGULAIR) 10 MG TABLET    Take 10 mg by mouth nightly    NAPROXEN (NAPROSYN) 500 MG TABLET    Take 1 tablet by mouth 2 times daily    ONDANSETRON (ZOFRAN ODT) 4 MG DISINTEGRATING TABLET    Take 1 tablet by mouth every 8 hours as needed for Nausea or Vomiting       ALLERGIES     Nut [peanut-containing drug products]    FAMILY HISTORY     History reviewed. No pertinent family history.        SOCIAL HISTORY       Social History     Socioeconomic History    Marital status: Single     Spouse name: None    Number of children: None    Years of education: None    Highest education level: None   Occupational History    None   Tobacco Use    Smoking status: Never Smoker    Smokeless tobacco: Never Used   Vaping Use    Vaping Use: Former   Substance and Sexual Activity    Alcohol use: No    Drug use: Not Currently     Types: Marijuana     Comment: social    Sexual activity: None   Other Topics Concern    None   Social History Narrative    None     Social Determinants of Health     Financial Resource Strain:     Difficulty of Paying Living Expenses:    Food Insecurity:     Worried About Running Out of Food in the Last Year:    [address] in the Last Year:    Transportation Needs:     Lack of Transportation (Medical):  Lack of Transportation (Non-Medical):    Physical Activity:     Days of Exercise per Week:     Minutes of Exercise per Session:    Stress:     Feeling of Stress :    Social Connections:     Frequency of Communication with Friends and Family:     Frequency of Social Gatherings with Friends and Family:     Attends Anglican Services:     Active Member of Clubs or Organizations:     Attends Club or Organization Meetings:     Marital Status:    Intimate Partner Violence:     Fear of Current or Ex-Partner:     Emotionally Abused:     Physically Abused:     Sexually Abused:        SCREENINGS    Gautam Coma Scale  Eye Opening: Spontaneous  Best Verbal Response: Oriented  Best Motor Response: Obeys commands  Gautam Coma Scale Score: 15 @FLOW(32270429)@      PHYSICAL EXAM    (up to 7 for level 4, 8 or more for level 5)     ED Triage Vitals [10/15/21 1924]   BP Temp Temp Source Pulse Resp SpO2 Height Weight   123/84 98.7 °F (37.1 °C) Oral 113 18 97 % 5' 8\" (1.727 m) 155 lb (70.3 kg)       Physical Exam  Vitals and nursing note reviewed. Constitutional:       General: He is not in acute distress. Appearance: He is not ill-appearing, toxic-appearing or diaphoretic. Comments: At alert cooperative patient talks in full sentences slightly uncomfortable because the rash and itching all over   HENT:      Head: Atraumatic. Right Ear: Tympanic membrane, ear canal and external ear normal.      Left Ear: Tympanic membrane, ear canal and external ear normal.      Nose: Nose normal.      Mouth/Throat:      Mouth: Mucous membranes are moist.      Pharynx: No oropharyngeal exudate or posterior oropharyngeal erythema.       Comments: Attention come to the face and to the mouth patient has no angioedema no swelling of the tongue no swelling of the lips has no respiratory compromise  Eyes: General:         Right eye: No discharge. Left eye: No discharge. Extraocular Movements: Extraocular movements intact. Pupils: Pupils are equal, round, and reactive to light. Neck:      Vascular: No carotid bruit. Cardiovascular:      Rate and Rhythm: Normal rate and regular rhythm. Pulses: Normal pulses. Heart sounds: Normal heart sounds. No murmur heard. No friction rub. No gallop. Pulmonary:      Effort: No respiratory distress. Breath sounds: No stridor. No wheezing, rhonchi or rales. Chest:      Chest wall: No tenderness. Abdominal:      General: Abdomen is flat. There is no distension. Palpations: There is no mass. Tenderness: There is no abdominal tenderness. There is no right CVA tenderness, left CVA tenderness, guarding or rebound. Hernia: No hernia is present. Musculoskeletal:      Cervical back: Normal range of motion and neck supple. No rigidity or tenderness. Lymphadenopathy:      Cervical: No cervical adenopathy. Skin:     Capillary Refill: Capillary refill takes less than 2 seconds. Findings: Erythema and rash present. Comments: Attention to skin patient has a blotches of rash erythematous rash raised above the skin blanching on pressure medical history urticarial rash to the extremities abdomen   Neurological:      Mental Status: He is alert.          DIAGNOSTIC RESULTS     EKG: All EKG's are interpreted by the Emergency Department Physician who either signs or Co-signsthis chart in the absence of a cardiologist.        RADIOLOGY:   Laruth Rodríguez such as CT, Ultrasound and MRI are read by the radiologist. Plain radiographic images are visualized and preliminarily interpreted by the emergency physician with the below findings:        Interpretation per the Radiologist below, if available at the time ofthis note:    No orders to display         ED BEDSIDE ULTRASOUND:   Performed by ED Physician - none    LABS:  Labs Reviewed - No data to display    All other labs were within normal range or not returned as of this dictation. EMERGENCY DEPARTMENT COURSE and DIFFERENTIAL DIAGNOSIS/MDM:   Vitals:    Vitals:    10/15/21 1924 10/15/21 2045 10/15/21 2106   BP: 123/84 (!) 102/58 109/62   Pulse: 113 78 82   Resp: 18  17   Temp: 98.7 °F (37.1 °C)     TempSrc: Oral     SpO2: 97% 95% 99%   Weight: 155 lb (70.3 kg)     Height: 5' 8\" (1.727 m)           MDM    CRITICAL CARE TIME   Total Critical Care time was  minutes, excluding separately reportableprocedures. There was a high probability of clinicallysignificant/life threatening deterioration in the patient's condition which required my urgent intervention. CONSULTS:  None    PROCEDURES:  Unless otherwise noted below, none     Procedures    FINAL IMPRESSION      1.  Anaphylaxis, initial encounter          DISPOSITION/PLAN   DISPOSITION        PATIENT REFERRED TO:  [address] [place] [id number]    In 2 days        DISCHARGE MEDICATIONS:  New Prescriptions    DIPHENHYDRAMINE (BENADRYL) 25 MG CAPSULE    Take 2 capsules by mouth every 6 hours as needed for Itching or Allergies (rash)    EPINEPHRINE (EPIPEN 2-NANCY) 0.3 MG/0.3ML SOAJ INJECTION    Inject 0.3 mLs into the muscle once for 1 dose Use as directed for allergic reaction    PREDNISONE (DELTASONE) 20 MG TABLET    Take 2 tablets by mouth daily for 5 doses          (Please note that portions of this note were completed with a voice recognition program.  Efforts were made to edit the dictations but occasionally words are mis-transcribed.)    Everardo Lilly MD (electronically signed)  Attending Emergency Physician       Everardo Lilly MD  10/15/21 0563       Everardo Lilly MD  10/15/21 0194

## 2021-10-15 NOTE — ED TRIAGE NOTES
Patient states he had allergic reaction to peanut at restaurant. States he used epi pen x2. Rash/hives noted to arms, trunk and face. A/0 x3, ambulatory, resps even and unlabored on room air.

## 2021-10-16 NOTE — ED NOTES
Explained discharge instructions and three prescriptions to patient. Went over discharge diagnosis and pertinent educational material with patient. Patient stated understanding of discharge diagnosis, instructions, and prescriptions. Patient denies any questions at this time, all concerns addressed. No signs or symptoms of pain or distress noted at this time. Patient discharged to home with mother. A/0 x3, ambulatory, resps even and unlabored on room air. Follow up instructions and reasons to return to ER reviewed. Patient denies needs at time of discharge. Patient states he is feeling much better and is ready to go home, rash/hives has resolved at this time.       Bronwyn Vance RN  10/15/21 3883

## 2022-02-24 ENCOUNTER — HOSPITAL ENCOUNTER (EMERGENCY)
Age: 27
Discharge: HOME | End: 2022-02-24
Attending: EMERGENCY MEDICINE
Payer: COMMERCIAL

## 2022-02-24 VITALS
TEMPERATURE: 99.4 F | HEART RATE: 108 BPM | OXYGEN SATURATION: 97 % | SYSTOLIC BLOOD PRESSURE: 135 MMHG | RESPIRATION RATE: 16 BRPM | WEIGHT: 155 LBS | HEIGHT: 68 IN | BODY MASS INDEX: 23.49 KG/M2 | DIASTOLIC BLOOD PRESSURE: 71 MMHG

## 2022-02-24 DIAGNOSIS — T78.40XA ALLERGIC REACTION, INITIAL ENCOUNTER: Primary | ICD-10-CM

## 2022-02-24 PROCEDURE — 96374 THER/PROPH/DIAG INJ IV PUSH: CPT

## 2022-02-24 PROCEDURE — 96375 TX/PRO/DX INJ NEW DRUG ADDON: CPT

## 2022-02-24 PROCEDURE — 6360000002 HC RX W HCPCS: Performed by: EMERGENCY MEDICINE

## 2022-02-24 PROCEDURE — 99284 EMERGENCY DEPT VISIT MOD MDM: CPT

## 2022-02-24 RX ORDER — METHYLPREDNISOLONE SODIUM SUCCINATE 125 MG/2ML
125 INJECTION, POWDER, LYOPHILIZED, FOR SOLUTION INTRAMUSCULAR; INTRAVENOUS ONCE
Status: COMPLETED | OUTPATIENT
Start: 2022-02-24 | End: 2022-02-24

## 2022-02-24 RX ORDER — PREDNISONE 20 MG/1
40 TABLET ORAL DAILY
Qty: 6 TABLET | Refills: 0 | Status: SHIPPED | OUTPATIENT
Start: 2022-02-24 | End: 2022-02-27

## 2022-02-24 RX ORDER — DIPHENHYDRAMINE HYDROCHLORIDE 50 MG/ML
12.5 INJECTION INTRAMUSCULAR; INTRAVENOUS ONCE
Status: COMPLETED | OUTPATIENT
Start: 2022-02-24 | End: 2022-02-24

## 2022-02-24 RX ADMIN — DIPHENHYDRAMINE HYDROCHLORIDE 12.5 MG: 50 INJECTION INTRAMUSCULAR; INTRAVENOUS at 20:02

## 2022-02-24 RX ADMIN — METHYLPREDNISOLONE SODIUM SUCCINATE 125 MG: 125 INJECTION, POWDER, FOR SOLUTION INTRAMUSCULAR; INTRAVENOUS at 20:03

## 2022-02-24 ASSESSMENT — ENCOUNTER SYMPTOMS
COUGH: 0
SHORTNESS OF BREATH: 0
NAUSEA: 0
COLOR CHANGE: 0
SORE THROAT: 0
EYE REDNESS: 0
DIARRHEA: 0
TROUBLE SWALLOWING: 0
EYE ITCHING: 0
VOMITING: 0
BACK PAIN: 0
SINUS PAIN: 0
WHEEZING: 0
ABDOMINAL PAIN: 0

## 2022-02-25 NOTE — ED PROVIDER NOTES
[address] [place] [address] ED  EMERGENCY DEPARTMENT ENCOUNTER      Pt Name: Reginald Vázquez  MRN: [id number]  [place] 1995  Date of evaluation: 2/24/2022  Provider: Claire Maldonado DO    CHIEF COMPLAINT       Chief Complaint   Patient presents with    Allergic Reaction     peanut allergy accidentaly ate something with peanuts and used his epi pen. states swellign noted in his mouth      Chief complaint: Allergic reaction  History of chief complaint: This 72-year-old male presents the emergency department concerned with allergic reaction. Patient states he has a history of severe reaction to peanuts states he was eating a muffin approximately 20 minutes prior to arrival and believes there must of been some cross-contamination of peanuts because started getting tingling swelling sensation in his mild has had previous with allergic reaction. Patient denies any chest pain palpitation short of breath weak or dizzy feeling no difficulty breathing or swallowing. Patient states he is otherwise been well no recent fevers chills cough cold vomiting or diarrhea. No abdominal pain. Patient denies other complaint    Nursing Notes were reviewed. REVIEW OF SYSTEMS    (2-9 systems for level 4, 10 or more for level 5)     Review of Systems   Constitutional: Negative for chills, diaphoresis and fever. HENT: Negative for congestion, sinus pain, sore throat and trouble swallowing. Eyes: Negative for redness and itching. Respiratory: Negative for cough, shortness of breath and wheezing. Cardiovascular: Negative for chest pain, palpitations and leg swelling. Gastrointestinal: Negative for abdominal pain, diarrhea, nausea and vomiting. Genitourinary: Negative for dysuria, flank pain and frequency. Musculoskeletal: Negative for back pain and myalgias. Skin: Negative for color change and rash. Neurological: Negative for dizziness, weakness and headaches. Hematological: Negative for adenopathy.        Except as noted above the remainder of the review of systems was reviewed and negative. PAST MEDICAL HISTORY     Past Medical History:   Diagnosis Date    Asthma          SURGICAL HISTORY     History reviewed. No pertinent surgical history. CURRENT MEDICATIONS       Previous Medications    ALBUTEROL SULFATE  (90 BASE) MCG/ACT INHALER    Inhale 2 puffs into the lungs every 6 hours as needed for Wheezing    DIPHENHYDRAMINE (BENADRYL) 25 MG CAPSULE    Take 2 capsules by mouth every 6 hours as needed for Itching or Allergies (rash)    EPINEPHRINE (EPIPEN 2-NANCY) 0.3 MG/0.3ML SOAJ INJECTION    Inject 0.3 mLs into the muscle once for 1 dose Use as directed for allergic reaction    IBUPROFEN (ADVIL;MOTRIN) 800 MG TABLET    Take 1 tablet by mouth every 8 hours as needed for Pain    MONTELUKAST (SINGULAIR) 10 MG TABLET    Take 10 mg by mouth nightly    NAPROXEN (NAPROSYN) 500 MG TABLET    Take 1 tablet by mouth 2 times daily    ONDANSETRON (ZOFRAN ODT) 4 MG DISINTEGRATING TABLET    Take 1 tablet by mouth every 8 hours as needed for Nausea or Vomiting       ALLERGIES     Nut [peanut-containing drug products]    FAMILY HISTORY     History reviewed. No pertinent family history.        SOCIAL HISTORY       Social History     Socioeconomic History    Marital status: Single     Spouse name: None    Number of children: None    Years of education: None    Highest education level: None   Occupational History    None   Tobacco Use    Smoking status: Never Smoker    Smokeless tobacco: Never Used   Vaping Use    Vaping Use: Former   Substance and Sexual Activity    Alcohol use: No    Drug use: Not Currently     Types: Marijuana Jaquan Mendosa     Comment: social    Sexual activity: None   Other Topics Concern    None   Social History Narrative    None     Social Determinants of Health     Financial Resource Strain:     Difficulty of Paying Living Expenses: Not on file   Food Insecurity:     Worried About Running Out of Food in the Last Year: Not on file    Ran Out of Food in the Last Year: Not on file   Transportation Needs:     Lack of Transportation (Medical): Not on file    Lack of Transportation (Non-Medical):  Not on file   Physical Activity:     Days of Exercise per Week: Not on file    Minutes of Exercise per Session: Not on file   Stress:     Feeling of Stress : Not on file   Social Connections:     Frequency of Communication with Friends and Family: Not on file    Frequency of Social Gatherings with Friends and Family: Not on file    Attends Yazidism Services: Not on file    Active Member of [address] [place] [address] [employer] or Organizations: Not on file    Attends Club or Organization Meetings: Not on file    Marital Status: Not on file   Intimate Partner Violence:     Fear of Current or Ex-Partner: Not on file    Emotionally Abused: Not on file    Physically Abused: Not on file    Sexually Abused: Not on file   Housing Stability:     Unable to Pay for Housing in the Last Year: Not on file    Number of [place] in the Last Year: Not on file    Unstable Housing in the Last Year: Not on file           PHYSICAL EXAM    (up to 7 for level 4, 8 or more for level 5)     ED Triage Vitals [02/24/22 1940]   BP Temp Temp Source Pulse Resp SpO2 Height Weight   135/71 99.4 °F (37.4 °C) Temporal 108 16 97 % 5' 8\" (1.727 m) 155 lb (70.3 kg)       Physical Exam   General appearance: Patient is awake alert interactive appropriate nontoxic in no acute distress  Head is atraumatic normocephalic  Eyes pupils are equal and reactive sclera white conjunctive are pink  Oral pharyngeal cavity is pink with good moisture, no gross erythema or swelling, no exudates or ulcerations no asymmetry, the airway is widely patent  Neck: Supple no meningeal signs no adenopathy no JVD  Heart: Regular rate and rhythm no gross murmurs rubs or clicks  Lungs: Breath sounds are clear with good air movement throughout no active wheezes rales or rhonchi no respiratory distress  Abdomen: Soft nontender with good bowel sounds no rebound rigidity or guarding no firm or pulsatile masses, no gross distention, femoral pulses full and symmetric  Back: Nontender to palpation no costovertebral angle tenderness  Skin: Warm and dry without rashes  Lower extremities: No edema or calf tenderness or asymmetry. EMERGENCY DEPARTMENT COURSE and DIFFERENTIAL DIAGNOSIS/MDM:   Vitals:    Vitals:    02/24/22 1940   BP: 135/71   Pulse: 108   Resp: 16   Temp: 99.4 °F (37.4 °C)   TempSrc: Temporal   SpO2: 97%   Weight: 155 lb (70.3 kg)   Height: 5' 8\" (1.727 m)     Treatment and course: Patient was placed on a cardiac monitor with continuous pulse ox monitoring and IV was established Solu-Medrol 125 mg IV along with Benadryl 25 mg IV was given here. Patient observed in the emergency department resting comfortably feeling improved vital signs stable repeat oral pharyngeal airway examination remains widely patent      FINAL IMPRESSION      1. Allergic reaction, initial encounter          DISPOSITION/PLAN   DISPOSITION    Patient discharged home with family advised increase of fluids a short course of prednisone 40 a day for 3 days was written for home. Patient advised to return if any change or worsening difficulty breathing or swallowing weak or dizzy feeling patient to follow-up with primary physician for repeat assessment in the next 2 to 3 days. PATIENT REFERRED TO:  MD Jose Cesar Inez [id number] [phone]    In 3 days        DISCHARGE MEDICATIONS:  New Prescriptions    PREDNISONE (DELTASONE) 20 MG TABLET    Take 2 tablets by mouth daily for 3 days     Controlled Substances Monitoring:     No flowsheet data found.     (Please note that portions of this note were completed with a voice recognition program.  Efforts were made to edit the dictations but occasionally words are mis-transcribed.)    Val Lefort, DO (electronically signed)  Attending Emergency Physician            Mounika Martinez,   02/24/22 5643

## 2022-02-25 NOTE — ED NOTES
Entered patients room and patient was not there. Called patient on cell phone and he stated \" I left because the ED was not taking proper care of me and discharging me\". I asked about his PIV and he stated \" I removed it myself\". I also informed him that he had prescriptions here and he stated \"I wont be needing them they are just steriods\". Informed patient to come back if needed. Dr. Jessika Wiley notified as well.       Ryan Dominguez RN  02/24/22 1179

## 2022-03-29 ENCOUNTER — HOSPITAL ENCOUNTER (EMERGENCY)
Age: 27
Discharge: HOME | End: 2022-03-29
Attending: EMERGENCY MEDICINE
Payer: COMMERCIAL

## 2022-03-29 VITALS
SYSTOLIC BLOOD PRESSURE: 128 MMHG | BODY MASS INDEX: 23.49 KG/M2 | OXYGEN SATURATION: 98 % | DIASTOLIC BLOOD PRESSURE: 78 MMHG | RESPIRATION RATE: 14 BRPM | HEIGHT: 68 IN | HEART RATE: 80 BPM | WEIGHT: 155 LBS | TEMPERATURE: 97.5 F

## 2022-03-29 DIAGNOSIS — Z91.010 PEANUT ALLERGY: Primary | ICD-10-CM

## 2022-03-29 LAB
ALBUMIN SERPL-MCNC: 5.1 G/DL (ref 3.5–4.6)
ALP BLD-CCNC: 82 U/L (ref 35–104)
ALT SERPL-CCNC: 20 U/L (ref 0–41)
ANION GAP SERPL CALCULATED.3IONS-SCNC: 15 MEQ/L (ref 9–15)
AST SERPL-CCNC: 22 U/L (ref 0–40)
BASOPHILS ABSOLUTE: 0.1 K/UL (ref 0–0.1)
BASOPHILS RELATIVE PERCENT: 1.2 % (ref 0.2–1.2)
BILIRUB SERPL-MCNC: 0.9 MG/DL (ref 0.2–0.7)
BUN BLDV-MCNC: 14 MG/DL (ref 6–20)
CALCIUM SERPL-MCNC: 9.8 MG/DL (ref 8.5–9.9)
CHLORIDE BLD-SCNC: 103 MEQ/L (ref 95–107)
CO2: 24 MEQ/L (ref 20–31)
CREAT SERPL-MCNC: 0.7 MG/DL (ref 0.7–1.2)
EOSINOPHILS ABSOLUTE: 0.5 K/UL (ref 0–0.5)
EOSINOPHILS RELATIVE PERCENT: 5.1 % (ref 0.8–7)
GFR AFRICAN AMERICAN: >60
GFR NON-AFRICAN AMERICAN: >60
GLOBULIN: 2.8 G/DL (ref 2.3–3.5)
GLUCOSE BLD-MCNC: 125 MG/DL (ref 70–99)
HCT VFR BLD CALC: 49.2 % (ref 42–52)
HEMOGLOBIN: 17.3 G/DL (ref 13.7–17.5)
IMMATURE GRANULOCYTES #: 0 K/UL
IMMATURE GRANULOCYTES %: 0.4 %
LYMPHOCYTES ABSOLUTE: 3.5 K/UL (ref 1.3–3.6)
LYMPHOCYTES RELATIVE PERCENT: 35.8 %
MAGNESIUM: 1.9 MG/DL (ref 1.7–2.4)
MCH RBC QN AUTO: 31.9 PG (ref 25.7–32.2)
MCHC RBC AUTO-ENTMCNC: 35.2 % (ref 32.3–36.5)
MCV RBC AUTO: 90.8 FL (ref 79–92.2)
MONOCYTES ABSOLUTE: 0.7 K/UL (ref 0.3–0.8)
MONOCYTES RELATIVE PERCENT: 6.7 % (ref 5.3–12.2)
NEUTROPHILS ABSOLUTE: 4.9 K/UL (ref 1.8–5.4)
NEUTROPHILS RELATIVE PERCENT: 50.8 % (ref 34–67.9)
PDW BLD-RTO: 12.9 % (ref 11.6–14.4)
PLATELET # BLD: 325 K/UL (ref 163–337)
POTASSIUM SERPL-SCNC: 4 MEQ/L (ref 3.4–4.9)
RBC # BLD: 5.42 M/UL (ref 4.63–6.08)
SODIUM BLD-SCNC: 142 MEQ/L (ref 135–144)
TOTAL PROTEIN: 7.9 G/DL (ref 6.3–8)
WBC # BLD: 9.7 K/UL (ref 4.2–9)

## 2022-03-29 PROCEDURE — 6360000002 HC RX W HCPCS: Performed by: EMERGENCY MEDICINE

## 2022-03-29 PROCEDURE — 83735 ASSAY OF MAGNESIUM: CPT

## 2022-03-29 PROCEDURE — 99284 EMERGENCY DEPT VISIT MOD MDM: CPT

## 2022-03-29 PROCEDURE — 36415 COLL VENOUS BLD VENIPUNCTURE: CPT

## 2022-03-29 PROCEDURE — 85025 COMPLETE CBC W/AUTO DIFF WBC: CPT

## 2022-03-29 PROCEDURE — 2580000003 HC RX 258: Performed by: EMERGENCY MEDICINE

## 2022-03-29 PROCEDURE — 96374 THER/PROPH/DIAG INJ IV PUSH: CPT

## 2022-03-29 PROCEDURE — 80053 COMPREHEN METABOLIC PANEL: CPT

## 2022-03-29 PROCEDURE — 96375 TX/PRO/DX INJ NEW DRUG ADDON: CPT

## 2022-03-29 RX ORDER — 0.9 % SODIUM CHLORIDE 0.9 %
1000 INTRAVENOUS SOLUTION INTRAVENOUS ONCE
Status: COMPLETED | OUTPATIENT
Start: 2022-03-29 | End: 2022-03-29

## 2022-03-29 RX ORDER — ONDANSETRON 4 MG/1
4 TABLET, ORALLY DISINTEGRATING ORAL EVERY 8 HOURS PRN
Qty: 20 TABLET | Refills: 0 | Status: SHIPPED | OUTPATIENT
Start: 2022-03-29 | End: 2022-06-05 | Stop reason: SDUPTHER

## 2022-03-29 RX ORDER — DIPHENHYDRAMINE HYDROCHLORIDE 50 MG/ML
25 INJECTION INTRAMUSCULAR; INTRAVENOUS ONCE
Status: COMPLETED | OUTPATIENT
Start: 2022-03-29 | End: 2022-03-29

## 2022-03-29 RX ORDER — PREDNISONE 20 MG/1
20 TABLET ORAL DAILY
Qty: 5 TABLET | Refills: 0 | Status: SHIPPED | OUTPATIENT
Start: 2022-03-29 | End: 2022-04-03

## 2022-03-29 RX ORDER — DIPHENHYDRAMINE HCL 25 MG
50 CAPSULE ORAL EVERY 6 HOURS PRN
Qty: 20 CAPSULE | Refills: 0 | Status: SHIPPED | OUTPATIENT
Start: 2022-03-29 | End: 2022-06-05

## 2022-03-29 RX ORDER — METHYLPREDNISOLONE SODIUM SUCCINATE 125 MG/2ML
125 INJECTION, POWDER, LYOPHILIZED, FOR SOLUTION INTRAMUSCULAR; INTRAVENOUS ONCE
Status: COMPLETED | OUTPATIENT
Start: 2022-03-29 | End: 2022-03-29

## 2022-03-29 RX ORDER — ONDANSETRON 2 MG/ML
4 INJECTION INTRAMUSCULAR; INTRAVENOUS ONCE
Status: COMPLETED | OUTPATIENT
Start: 2022-03-29 | End: 2022-03-29

## 2022-03-29 RX ADMIN — SODIUM CHLORIDE 1000 ML: 9 INJECTION, SOLUTION INTRAVENOUS at 17:49

## 2022-03-29 RX ADMIN — DIPHENHYDRAMINE HYDROCHLORIDE 25 MG: 50 INJECTION, SOLUTION INTRAMUSCULAR; INTRAVENOUS at 17:51

## 2022-03-29 RX ADMIN — METHYLPREDNISOLONE SODIUM SUCCINATE 125 MG: 125 INJECTION, POWDER, FOR SOLUTION INTRAMUSCULAR; INTRAVENOUS at 17:49

## 2022-03-29 RX ADMIN — ONDANSETRON HYDROCHLORIDE 4 MG: 2 SOLUTION INTRAMUSCULAR; INTRAVENOUS at 19:03

## 2022-03-29 ASSESSMENT — ENCOUNTER SYMPTOMS
RHINORRHEA: 0
SORE THROAT: 0
ABDOMINAL DISTENTION: 0
ABDOMINAL PAIN: 0
VOMITING: 0
SINUS PRESSURE: 0
COLOR CHANGE: 0
COUGH: 0
BACK PAIN: 0
NAUSEA: 0
APNEA: 0
WHEEZING: 0
PHOTOPHOBIA: 0
DIARRHEA: 0
EYE PAIN: 0
CONSTIPATION: 0
SHORTNESS OF BREATH: 0

## 2022-03-29 ASSESSMENT — PAIN SCALES - GENERAL
PAINLEVEL_OUTOF10: 0
PAINLEVEL_OUTOF10: 0

## 2022-03-29 ASSESSMENT — PAIN DESCRIPTION - LOCATION: LOCATION: THROAT;MOUTH

## 2022-03-29 ASSESSMENT — PAIN DESCRIPTION - ONSET: ONSET: SUDDEN

## 2022-03-29 ASSESSMENT — PAIN DESCRIPTION - DESCRIPTORS: DESCRIPTORS: ITCHING

## 2022-03-29 NOTE — ED PROVIDER NOTES
[address] [place] [address] ED  eMERGENCY dEPARTMENT eNCOUnter      Pt Name: Andre Chavez  MRN: [id number]  [place] 1995  Date of evaluation: 3/29/2022  Provider: Destinee Azevedo MD    CHIEF COMPLAINT       Chief Complaint   Patient presents with    Allergic Reaction     Pt ate peanut [employer] by accident, allergic to peanuts         HISTORY OF PRESENT ILLNESS   (Location/Symptom, Timing/Onset,Context/Setting, Quality, Duration, Modifying Factors, Severity)  Note limiting factors. Andre Chavez is a 27 y.o. male who presents to the emergency department with complaint of allergic reaction to peanut in M&M. Has known history of peanut allergies. He reacts with anaphylaxis. He uses EpiPen. Accidentally ate some peanuts in M[employer] and began feeling tingling in the throat. No throat swelling, breathing problem. No other systemic symptoms. HPI    Nursing Notes were reviewed. REVIEW OF SYSTEMS    (2-9 systems for level 4, 10 or more for level 5)     Review of Systems   Constitutional: Negative. Negative for activity change, appetite change, chills, fatigue and fever. HENT: Negative for congestion, ear discharge, ear pain, hearing loss, rhinorrhea, sinus pressure and sore throat. Eyes: Negative for photophobia, pain and visual disturbance. Respiratory: Negative for apnea, cough, shortness of breath and wheezing. Cardiovascular: Negative for chest pain, palpitations and leg swelling. Gastrointestinal: Negative for abdominal distention, abdominal pain, constipation, diarrhea, nausea and vomiting. Endocrine: Negative for cold intolerance, heat intolerance and polyuria. Genitourinary: Negative for dysuria, flank pain, frequency and urgency. Musculoskeletal: Negative for arthralgias, back pain, gait problem, myalgias and neck stiffness. Skin: Negative for color change, pallor and rash. Allergic/Immunologic: Negative for food allergies and immunocompromised state.    Neurological: Negative for dizziness, tremors, syncope, weakness, light-headedness and headaches. Psychiatric/Behavioral: Negative for agitation, confusion and hallucinations. All other systems reviewed and are negative. Except as noted above the remainder of the review of systems was reviewed and negative. PAST MEDICAL HISTORY     Past Medical History:   Diagnosis Date    Asthma          SURGICAL HISTORY     History reviewed. No pertinent surgical history. CURRENT MEDICATIONS       Previous Medications    ALBUTEROL SULFATE  (90 BASE) MCG/ACT INHALER    Inhale 2 puffs into the lungs every 6 hours as needed for Wheezing    DIPHENHYDRAMINE (BENADRYL) 25 MG CAPSULE    Take 2 capsules by mouth every 6 hours as needed for Itching or Allergies (rash)    EPINEPHRINE (EPIPEN 2-NANCY) 0.3 MG/0.3ML SOAJ INJECTION    Inject 0.3 mLs into the muscle once for 1 dose Use as directed for allergic reaction    MONTELUKAST (SINGULAIR) 10 MG TABLET    Take 10 mg by mouth nightly       ALLERGIES     Nut [peanut-containing drug products]    FAMILY HISTORY     History reviewed. No pertinent family history.        SOCIAL HISTORY       Social History     Socioeconomic History    Marital status: Single     Spouse name: None    Number of children: None    Years of education: None    Highest education level: None   Occupational History    None   Tobacco Use    Smoking status: Current Every Day Smoker     Packs/day: 0.50     Types: Cigarettes    Smokeless tobacco: Never Used   Vaping Use    Vaping Use: Former   Substance and Sexual Activity    Alcohol use: No    Drug use: Not Currently     Types: Marijuana Fredick Copping)     Comment: social    Sexual activity: None   Other Topics Concern    None   Social History Narrative    None     Social Determinants of Health     Financial Resource Strain:     Difficulty of Paying Living Expenses: Not on file   Food Insecurity:     Worried About Running Out of Food in the Last Year: Not on file    Ran Out of Food in the Last Year: Not on file   Transportation Needs:     Lack of Transportation (Medical): Not on file    Lack of Transportation (Non-Medical): Not on file   Physical Activity:     Days of Exercise per Week: Not on file    Minutes of Exercise per Session: Not on file   Stress:     Feeling of Stress : Not on file   Social Connections:     Frequency of Communication with Friends and Family: Not on file    Frequency of Social Gatherings with Friends and Family: Not on file    Attends Jew Services: Not on file    Active Member of [address] [place] [address] [employer] or Organizations: Not on file    Attends Club or Organization Meetings: Not on file    Marital Status: Not on file   Intimate Partner Violence:     Fear of Current or Ex-Partner: Not on file    Emotionally Abused: Not on file    Physically Abused: Not on file    Sexually Abused: Not on file   Housing Stability:     Unable to Pay for Housing in the Last Year: Not on file    Number of [place] in the Last Year: Not on file    Unstable Housing in the Last Year: Not on file       SCREENINGS             PHYSICAL EXAM    (up to 7 for level 4, 8 or more for level 5)     ED Triage Vitals [03/29/22 1735]   BP Temp Temp Source Pulse Resp SpO2 Height Weight   (!) 159/105 97.5 °F (36.4 °C) Oral 111 20 100 % 5' 8\" (1.727 m) 155 lb (70.3 kg)       Physical Exam  Vitals and nursing note reviewed. Constitutional:       General: He is not in acute distress. Appearance: He is well-developed. He is not diaphoretic. HENT:      Head: Normocephalic and atraumatic. Nose: Nose normal.      Mouth/Throat:      Pharynx: No oropharyngeal exudate. Eyes:      General: No scleral icterus. Right eye: No discharge. Left eye: No discharge. Conjunctiva/sclera: Conjunctivae normal.      Pupils: Pupils are equal, round, and reactive to light. Neck:      Thyroid: No thyromegaly. Vascular: No JVD. Trachea: No tracheal deviation. Cardiovascular:      Rate and Rhythm: Normal rate and regular rhythm. Heart sounds: Normal heart sounds. No murmur heard. No friction rub. No gallop. Pulmonary:      Effort: Pulmonary effort is normal. No respiratory distress. Breath sounds: Normal breath sounds. No stridor. No wheezing, rhonchi or rales. Chest:      Chest wall: No tenderness. Abdominal:      General: Abdomen is flat. Bowel sounds are normal. There is no distension. Palpations: Abdomen is soft. There is no mass. Tenderness: There is no abdominal tenderness. There is no right CVA tenderness, left CVA tenderness, guarding or rebound. Hernia: No hernia is present. Musculoskeletal:         General: No swelling, tenderness, deformity or signs of injury. Normal range of motion. Cervical back: Normal range of motion and neck supple. No rigidity. Right lower leg: No edema. Left lower leg: No edema. Lymphadenopathy:      Cervical: No cervical adenopathy. Skin:     General: Skin is warm and dry. Capillary Refill: Capillary refill takes less than 2 seconds. Coloration: Skin is not jaundiced or pale. Findings: No bruising, erythema, lesion or rash. Neurological:      General: No focal deficit present. Mental Status: He is alert and oriented to person, place, and time. Mental status is at baseline. Cranial Nerves: No cranial nerve deficit. Sensory: No sensory deficit. Motor: No weakness or abnormal muscle tone. Coordination: Coordination normal.      Gait: Gait normal.      Deep Tendon Reflexes: Reflexes are normal and symmetric. Reflexes normal.   Psychiatric:         Mood and Affect: Mood normal.         Behavior: Behavior normal.         Thought Content:  Thought content normal.         Judgment: Judgment normal.         DIAGNOSTIC RESULTS     EKG: All EKG's are interpreted by the Emergency Department Physician who either signs or Co-signs this chart in the absence of a cardiologist.        RADIOLOGY:   Non-plain film images such as CT, Ultrasound and MRI are read by the radiologist. Kristal Glimpse radiographicimages are visualized and preliminarily interpreted by the emergency physician with the below findings:        Interpretation per the Radiologist below, if available at the time of this note:    No orders to display         ED BEDSIDE ULTRASOUND:   Performed by ED Physician - none    LABS:  Labs Reviewed   COMPREHENSIVE METABOLIC PANEL   CBC WITH AUTO DIFFERENTIAL   MAGNESIUM       All other labs were within normal range or not returned as of this dictation. EMERGENCY DEPARTMENT COURSE and DIFFERENTIALDIAGNOSIS/MDM:   Vitals:    Vitals:    03/29/22 1735   BP: (!) 159/105   Pulse: 111   Resp: 20   Temp: 97.5 °F (36.4 °C)   TempSrc: Oral   SpO2: 100%   Weight: 155 lb (70.3 kg)   Height: 5' 8\" (1.727 m)           MDM    CRITICAL CARE TIME   Total Critical Care time was  minutes, excluding separately reportable procedures. There was a high probability of clinically significant/life threatening deterioration in the patient's condition which required my urgentintervention. CONSULTS:  None    PROCEDURES:  Unless otherwise noted below, none     Procedures    FINAL IMPRESSION    No diagnosis found. DISPOSITION/PLAN   DISPOSITION        PATIENT REFERRED TO:  No follow-up provider specified.     DISCHARGE MEDICATIONS:  New Prescriptions    No medications on file          (Please note that portions of this note were completed with a voice recognitionprogram.  Efforts were made to edit the dictations but occasionally words are mis-transcribed.)    Janeth Fontaine MD (electronically signed)  Attending Emergency Physician          Janeth Fontaine MD  03/29/22 2653

## 2022-06-05 ENCOUNTER — HOSPITAL ENCOUNTER (INPATIENT)
Age: 27
LOS: 5 days | Discharge: HOME | DRG: 885 | End: 2022-06-10
Attending: PSYCHIATRY & NEUROLOGY | Admitting: PSYCHIATRY & NEUROLOGY
Payer: COMMERCIAL

## 2022-06-05 ENCOUNTER — HOSPITAL ENCOUNTER (EMERGENCY)
Age: 27
Discharge: ANOTHER HOSPITAL | End: 2022-06-05
Attending: EMERGENCY MEDICINE
Payer: COMMERCIAL

## 2022-06-05 VITALS
HEIGHT: 68 IN | HEART RATE: 99 BPM | SYSTOLIC BLOOD PRESSURE: 110 MMHG | RESPIRATION RATE: 18 BRPM | DIASTOLIC BLOOD PRESSURE: 69 MMHG | OXYGEN SATURATION: 99 % | BODY MASS INDEX: 23.57 KG/M2

## 2022-06-05 DIAGNOSIS — F32.2 CURRENT SEVERE EPISODE OF MAJOR DEPRESSIVE DISORDER WITHOUT PSYCHOTIC FEATURES, UNSPECIFIED WHETHER RECURRENT (HCC): Primary | ICD-10-CM

## 2022-06-05 DIAGNOSIS — R45.851 SUICIDAL IDEATION: ICD-10-CM

## 2022-06-05 DIAGNOSIS — F10.920 ACUTE ALCOHOLIC INTOXICATION WITHOUT COMPLICATION (HCC): Primary | ICD-10-CM

## 2022-06-05 LAB
ACETAMINOPHEN LEVEL: <5 UG/ML (ref 10–30)
ALBUMIN SERPL-MCNC: 4.6 G/DL (ref 3.5–4.6)
ALP BLD-CCNC: 61 U/L (ref 35–104)
ALT SERPL-CCNC: 21 U/L (ref 0–41)
AMPHETAMINE SCREEN, URINE: NORMAL
ANION GAP SERPL CALCULATED.3IONS-SCNC: 13 MEQ/L (ref 9–15)
AST SERPL-CCNC: 21 U/L (ref 0–40)
BARBITURATE SCREEN URINE: NORMAL
BASOPHILS ABSOLUTE: 0.1 K/UL (ref 0–0.1)
BASOPHILS RELATIVE PERCENT: 1.1 % (ref 0.2–1.2)
BENZODIAZEPINE SCREEN, URINE: NORMAL
BILIRUB SERPL-MCNC: 0.3 MG/DL (ref 0.2–0.7)
BILIRUBIN URINE: NEGATIVE
BLOOD, URINE: NEGATIVE
BUN BLDV-MCNC: 11 MG/DL (ref 6–20)
CALCIUM SERPL-MCNC: 9 MG/DL (ref 8.5–9.9)
CANNABINOID SCREEN URINE: NORMAL
CHLORIDE BLD-SCNC: 109 MEQ/L (ref 95–107)
CHOLESTEROL, TOTAL: 88 MG/DL (ref 0–199)
CLARITY: CLEAR
CO2: 20 MEQ/L (ref 20–31)
COCAINE METABOLITE SCREEN URINE: NORMAL
COLOR: YELLOW
CREAT SERPL-MCNC: 0.72 MG/DL (ref 0.7–1.2)
EOSINOPHILS ABSOLUTE: 0.3 K/UL (ref 0–0.5)
EOSINOPHILS RELATIVE PERCENT: 3.8 % (ref 0.8–7)
ETHANOL PERCENT: 0.1 G/DL
ETHANOL PERCENT: 0.23 G/DL
ETHANOL: 113 MG/DL (ref 0–0.08)
ETHANOL: 267 MG/DL (ref 0–0.08)
GFR AFRICAN AMERICAN: >60
GFR NON-AFRICAN AMERICAN: >60
GLOBULIN: 2.5 G/DL (ref 2.3–3.5)
GLUCOSE BLD-MCNC: 123 MG/DL (ref 70–99)
GLUCOSE URINE: NEGATIVE MG/DL
HCT VFR BLD CALC: 44.6 % (ref 42–52)
HDLC SERPL-MCNC: 44 MG/DL (ref 40–59)
HEMOGLOBIN: 15.4 G/DL (ref 13.7–17.5)
IMMATURE GRANULOCYTES #: 0 K/UL
IMMATURE GRANULOCYTES %: 0.4 %
KETONES, URINE: NEGATIVE MG/DL
LDL CHOLESTEROL CALCULATED: 38 MG/DL (ref 0–129)
LEUKOCYTE ESTERASE, URINE: NEGATIVE
LIPASE: 25 U/L (ref 12–95)
LYMPHOCYTES ABSOLUTE: 3.2 K/UL (ref 1.3–3.6)
LYMPHOCYTES RELATIVE PERCENT: 38.3 %
Lab: NORMAL
MAGNESIUM: 2.4 MG/DL (ref 1.7–2.4)
MCH RBC QN AUTO: 31.2 PG (ref 25.7–32.2)
MCHC RBC AUTO-ENTMCNC: 34.5 % (ref 32.3–36.5)
MCV RBC AUTO: 90.3 FL (ref 79–92.2)
METHADONE SCREEN, URINE: NORMAL
MONOCYTES ABSOLUTE: 0.5 K/UL (ref 0.3–0.8)
MONOCYTES RELATIVE PERCENT: 5.7 % (ref 5.3–12.2)
NEUTROPHILS ABSOLUTE: 4.2 K/UL (ref 1.8–5.4)
NEUTROPHILS RELATIVE PERCENT: 50.7 % (ref 34–67.9)
NITRITE, URINE: NEGATIVE
OPIATE SCREEN URINE: NORMAL
OXYCODONE URINE: NORMAL
PDW BLD-RTO: 12 % (ref 11.6–14.4)
PH UA: 5 (ref 5–9)
PHENCYCLIDINE SCREEN URINE: NORMAL
PLATELET # BLD: 297 K/UL (ref 163–337)
POTASSIUM SERPL-SCNC: 3.9 MEQ/L (ref 3.4–4.9)
PROPOXYPHENE SCREEN: NORMAL
PROTEIN UA: NEGATIVE MG/DL
RBC # BLD: 4.94 M/UL (ref 4.63–6.08)
SALICYLATE, SERUM: <0.3 MG/DL (ref 15–30)
SARS-COV-2, NAAT: NOT DETECTED
SODIUM BLD-SCNC: 142 MEQ/L (ref 135–144)
SPECIFIC GRAVITY UA: 1.01 (ref 1–1.03)
TOTAL CK: 161 U/L (ref 0–190)
TOTAL PROTEIN: 7.1 G/DL (ref 6.3–8)
TRIGL SERPL-MCNC: 28 MG/DL (ref 0–150)
TSH SERPL DL<=0.05 MIU/L-ACNC: 0.46 UIU/ML (ref 0.44–3.86)
URINE REFLEX TO CULTURE: NORMAL
UROBILINOGEN, URINE: 0.2 E.U./DL
WBC # BLD: 8.2 K/UL (ref 4.2–9)

## 2022-06-05 PROCEDURE — 82077 ASSAY SPEC XCP UR&BREATH IA: CPT

## 2022-06-05 PROCEDURE — 82550 ASSAY OF CK (CPK): CPT

## 2022-06-05 PROCEDURE — 80179 DRUG ASSAY SALICYLATE: CPT

## 2022-06-05 PROCEDURE — 87635 SARS-COV-2 COVID-19 AMP PRB: CPT

## 2022-06-05 PROCEDURE — 80061 LIPID PANEL: CPT

## 2022-06-05 PROCEDURE — 2580000003 HC RX 258: Performed by: EMERGENCY MEDICINE

## 2022-06-05 PROCEDURE — 6370000000 HC RX 637 (ALT 250 FOR IP): Performed by: PSYCHIATRY & NEUROLOGY

## 2022-06-05 PROCEDURE — 93005 ELECTROCARDIOGRAM TRACING: CPT | Performed by: EMERGENCY MEDICINE

## 2022-06-05 PROCEDURE — 1240000000 HC EMOTIONAL WELLNESS R&B

## 2022-06-05 PROCEDURE — 80307 DRUG TEST PRSMV CHEM ANLYZR: CPT

## 2022-06-05 PROCEDURE — 99285 EMERGENCY DEPT VISIT HI MDM: CPT

## 2022-06-05 PROCEDURE — 85025 COMPLETE CBC W/AUTO DIFF WBC: CPT

## 2022-06-05 PROCEDURE — 80143 DRUG ASSAY ACETAMINOPHEN: CPT

## 2022-06-05 PROCEDURE — 6360000002 HC RX W HCPCS: Performed by: EMERGENCY MEDICINE

## 2022-06-05 PROCEDURE — 80053 COMPREHEN METABOLIC PANEL: CPT

## 2022-06-05 PROCEDURE — 81003 URINALYSIS AUTO W/O SCOPE: CPT

## 2022-06-05 PROCEDURE — 83690 ASSAY OF LIPASE: CPT

## 2022-06-05 PROCEDURE — 84443 ASSAY THYROID STIM HORMONE: CPT

## 2022-06-05 PROCEDURE — 94664 DEMO&/EVAL PT USE INHALER: CPT

## 2022-06-05 PROCEDURE — 83735 ASSAY OF MAGNESIUM: CPT

## 2022-06-05 PROCEDURE — 96374 THER/PROPH/DIAG INJ IV PUSH: CPT

## 2022-06-05 PROCEDURE — 36415 COLL VENOUS BLD VENIPUNCTURE: CPT

## 2022-06-05 RX ORDER — 0.9 % SODIUM CHLORIDE 0.9 %
1000 INTRAVENOUS SOLUTION INTRAVENOUS ONCE
Status: COMPLETED | OUTPATIENT
Start: 2022-06-05 | End: 2022-06-05

## 2022-06-05 RX ORDER — TRAZODONE HYDROCHLORIDE 50 MG/1
50 TABLET ORAL NIGHTLY PRN
Status: DISCONTINUED | OUTPATIENT
Start: 2022-06-05 | End: 2022-06-08

## 2022-06-05 RX ORDER — EPINEPHRINE 0.3 MG/.3ML
0.3 INJECTION SUBCUTANEOUS ONCE
Status: DISCONTINUED | OUTPATIENT
Start: 2022-06-05 | End: 2022-06-05 | Stop reason: ALTCHOICE

## 2022-06-05 RX ORDER — ONDANSETRON 2 MG/ML
4 INJECTION INTRAMUSCULAR; INTRAVENOUS ONCE
Status: COMPLETED | OUTPATIENT
Start: 2022-06-05 | End: 2022-06-05

## 2022-06-05 RX ORDER — MAGNESIUM HYDROXIDE/ALUMINUM HYDROXICE/SIMETHICONE 120; 1200; 1200 MG/30ML; MG/30ML; MG/30ML
30 SUSPENSION ORAL PRN
Status: DISCONTINUED | OUTPATIENT
Start: 2022-06-05 | End: 2022-06-10 | Stop reason: HOSPADM

## 2022-06-05 RX ORDER — HYDROXYZINE HYDROCHLORIDE 50 MG/ML
50 INJECTION, SOLUTION INTRAMUSCULAR EVERY 6 HOURS PRN
Status: DISCONTINUED | OUTPATIENT
Start: 2022-06-05 | End: 2022-06-10 | Stop reason: HOSPADM

## 2022-06-05 RX ORDER — ONDANSETRON 4 MG/1
4 TABLET, ORALLY DISINTEGRATING ORAL EVERY 8 HOURS PRN
Qty: 20 TABLET | Refills: 0 | Status: ON HOLD | OUTPATIENT
Start: 2022-06-05 | End: 2022-06-10 | Stop reason: HOSPADM

## 2022-06-05 RX ORDER — EPINEPHRINE 1 MG/ML
0.3 INJECTION, SOLUTION, CONCENTRATE INTRAVENOUS
Status: DISPENSED | OUTPATIENT
Start: 2022-06-05 | End: 2022-06-05

## 2022-06-05 RX ORDER — HALOPERIDOL 5 MG
5 TABLET ORAL EVERY 6 HOURS PRN
Status: DISCONTINUED | OUTPATIENT
Start: 2022-06-05 | End: 2022-06-10 | Stop reason: HOSPADM

## 2022-06-05 RX ORDER — ALBUTEROL SULFATE 90 UG/1
2 AEROSOL, METERED RESPIRATORY (INHALATION) EVERY 6 HOURS PRN
Status: DISCONTINUED | OUTPATIENT
Start: 2022-06-05 | End: 2022-06-10 | Stop reason: HOSPADM

## 2022-06-05 RX ORDER — HYDROXYZINE PAMOATE 50 MG/1
50 CAPSULE ORAL EVERY 6 HOURS PRN
Status: DISCONTINUED | OUTPATIENT
Start: 2022-06-05 | End: 2022-06-10 | Stop reason: HOSPADM

## 2022-06-05 RX ORDER — ACETAMINOPHEN 325 MG/1
650 TABLET ORAL EVERY 4 HOURS PRN
Status: DISCONTINUED | OUTPATIENT
Start: 2022-06-05 | End: 2022-06-10 | Stop reason: HOSPADM

## 2022-06-05 RX ORDER — BENZTROPINE MESYLATE 1 MG/ML
2 INJECTION INTRAMUSCULAR; INTRAVENOUS 2 TIMES DAILY PRN
Status: DISCONTINUED | OUTPATIENT
Start: 2022-06-05 | End: 2022-06-10 | Stop reason: HOSPADM

## 2022-06-05 RX ORDER — HALOPERIDOL 5 MG/ML
5 INJECTION INTRAMUSCULAR EVERY 6 HOURS PRN
Status: DISCONTINUED | OUTPATIENT
Start: 2022-06-05 | End: 2022-06-10 | Stop reason: HOSPADM

## 2022-06-05 RX ADMIN — TRAZODONE HYDROCHLORIDE 50 MG: 50 TABLET ORAL at 22:57

## 2022-06-05 RX ADMIN — ONDANSETRON 4 MG: 2 INJECTION INTRAMUSCULAR; INTRAVENOUS at 04:47

## 2022-06-05 RX ADMIN — ACETAMINOPHEN 650 MG: 325 TABLET ORAL at 17:19

## 2022-06-05 RX ADMIN — SODIUM CHLORIDE 1000 ML: 9 INJECTION, SOLUTION INTRAVENOUS at 04:59

## 2022-06-05 ASSESSMENT — ENCOUNTER SYMPTOMS
ABDOMINAL PAIN: 0
COUGH: 0
SORE THROAT: 0
COUGH: 0
VOMITING: 0
BACK PAIN: 0
SHORTNESS OF BREATH: 0
NAUSEA: 0
ABDOMINAL PAIN: 0
EYE PAIN: 0
PHOTOPHOBIA: 0
BACK PAIN: 0
SHORTNESS OF BREATH: 0
NAUSEA: 0
RHINORRHEA: 0
COLOR CHANGE: 0
DIARRHEA: 0
CONSTIPATION: 0
SORE THROAT: 0
DIARRHEA: 0
PHOTOPHOBIA: 0
WHEEZING: 0
EYE PAIN: 0
ABDOMINAL DISTENTION: 0
VOMITING: 0
RHINORRHEA: 0
SINUS PRESSURE: 0
APNEA: 0

## 2022-06-05 ASSESSMENT — PAIN - FUNCTIONAL ASSESSMENT
PAIN_FUNCTIONAL_ASSESSMENT: NONE - DENIES PAIN
PAIN_FUNCTIONAL_ASSESSMENT: NONE - DENIES PAIN

## 2022-06-05 ASSESSMENT — PAIN DESCRIPTION - DESCRIPTORS: DESCRIPTORS: ACHING

## 2022-06-05 ASSESSMENT — PATIENT HEALTH QUESTIONNAIRE - PHQ9: SUM OF ALL RESPONSES TO PHQ QUESTIONS 1-9: 17

## 2022-06-05 ASSESSMENT — LIFESTYLE VARIABLES: HOW OFTEN DO YOU HAVE A DRINK CONTAINING ALCOHOL: MONTHLY OR LESS

## 2022-06-05 ASSESSMENT — SLEEP AND FATIGUE QUESTIONNAIRES
DO YOU USE A SLEEP AID: NO
SLEEP PATTERN: DIFFICULTY FALLING ASLEEP;DISTURBED/INTERRUPTED SLEEP
AVERAGE NUMBER OF SLEEP HOURS: 3
DO YOU HAVE DIFFICULTY SLEEPING: YES

## 2022-06-05 ASSESSMENT — PAIN DESCRIPTION - LOCATION: LOCATION: HEAD

## 2022-06-05 ASSESSMENT — PAIN SCALES - GENERAL: PAINLEVEL_OUTOF10: 4

## 2022-06-05 NOTE — ED TRIAGE NOTES
Presents to ED via EMS from [facility] ED for a complaint of suicidal ideation and alcohol intoxication. Patient is pink slipped.

## 2022-06-05 NOTE — ED PROVIDER NOTES
[facility] ED  EMERGENCY DEPARTMENT ENCOUNTER      Pt Name: Doreen Manuel  MRN: [id number]  [place] 1995  Date of evaluation: 6/5/2022  Provider: Patsy Sommer PA-C      HISTORY OF PRESENT ILLNESS    Doreen Manuel is a 27 y.o. male who presents to the Emergency Department with suicidal ideation. Patient has had multiple statements over the last month reporting to be suicidal.  He has had previous actions years ago where he is held a gun up to his head. He has not done any self-harm at this time. Pt was seen at [facility] [place] ED, please see that note. REVIEW OF SYSTEMS       Review of Systems   Constitutional: Negative for chills, diaphoresis, fatigue and fever. HENT: Negative for congestion, rhinorrhea and sore throat. Eyes: Negative for photophobia and pain. Respiratory: Negative for cough and shortness of breath. Cardiovascular: Negative for chest pain and palpitations. Gastrointestinal: Negative for abdominal pain, diarrhea, nausea and vomiting. Genitourinary: Negative for dysuria and flank pain. Musculoskeletal: Negative for back pain. Skin: Negative for rash. Neurological: Negative for dizziness, light-headedness and headaches. Psychiatric/Behavioral: Positive for behavioral problems and suicidal ideas. All other systems reviewed and are negative. PAST MEDICAL HISTORY     Past Medical History:   Diagnosis Date    Asthma          SURGICAL HISTORY     History reviewed. No pertinent surgical history.       CURRENT MEDICATIONS       Previous Medications    ALBUTEROL SULFATE  (90 BASE) MCG/ACT INHALER    Inhale 2 puffs into the lungs every 6 hours as needed for Wheezing    EPINEPHRINE (EPIPEN 2-NANCY) 0.3 MG/0.3ML SOAJ INJECTION    Inject 0.3 mLs into the muscle once for 1 dose Use as directed for allergic reaction    ONDANSETRON (ZOFRAN ODT) 4 MG DISINTEGRATING TABLET    Take 1 tablet by mouth every 8 hours as needed for Nausea       ALLERGIES     Nut [peanut-containing drug products]    FAMILY HISTORY     History reviewed. No pertinent family history. SOCIAL HISTORY       Social History     Socioeconomic History    Marital status: Single     Spouse name: None    Number of children: None    Years of education: None    Highest education level: None   Occupational History    None   Tobacco Use    Smoking status: Current Every Day Smoker     Packs/day: 0.50     Types: Cigarettes    Smokeless tobacco: Never Used   Vaping Use    Vaping Use: Former   Substance and Sexual Activity    Alcohol use: Yes     Comment: occasional    Drug use: Not Currently     Types: Marijuana Todd Relic)     Comment: social    Sexual activity: None   Other Topics Concern    None   Social History Narrative    None     Social Determinants of Health     Financial Resource Strain:     Difficulty of Paying Living Expenses: Not on file   Food Insecurity:     Worried About Running Out of Food in the Last Year: Not on file    Maxine of Food in the Last Year: Not on file   Transportation Needs:     Lack of Transportation (Medical): Not on file    Lack of Transportation (Non-Medical):  Not on file   Physical Activity:     Days of Exercise per Week: Not on file    Minutes of Exercise per Session: Not on file   Stress:     Feeling of Stress : Not on file   Social Connections:     Frequency of Communication with Friends and Family: Not on file    Frequency of Social Gatherings with Friends and Family: Not on file    Attends Yarsanism Services: Not on file    Active Member of Clubs or Organizations: Not on file    Attends Club or Organization Meetings: Not on file    Marital Status: Not on file   Intimate Partner Violence:     Fear of Current or Ex-Partner: Not on file    Emotionally Abused: Not on file    Physically Abused: Not on file    Sexually Abused: Not on file   Housing Stability:     Unable to Pay for Housing in the Last Year: Not on file    Number of Places Lived in the Last Year: Not on file    Unstable Housing in the Last Year: Not on file       SCREENINGS    Gautam Coma Scale  Eye Opening: Spontaneous  Best Verbal Response: Oriented  Best Motor Response: Obeys commands  Gautam Coma Scale Score: 15        PHYSICAL EXAM    (up to 7 for level 4, 8 or more for level 5)     ED Triage Vitals [06/05/22 0619]   BP Temp Temp Source Heart Rate Resp SpO2 Height Weight   118/65 97.6 °F (36.4 °C) Tympanic 84 16 100 % 5' 9\" (1.753 m) 151 lb (68.5 kg)       Physical Exam  Vitals and nursing note reviewed. Constitutional:       General: He is not in acute distress. Appearance: Normal appearance. He is well-developed. He is not diaphoretic. HENT:      Head: Normocephalic and atraumatic. Eyes:      General: Lids are normal.      Conjunctiva/sclera: Conjunctivae normal.   Cardiovascular:      Rate and Rhythm: Normal rate and regular rhythm. Pulses: Normal pulses. Heart sounds: Normal heart sounds. Pulmonary:      Effort: Pulmonary effort is normal.      Breath sounds: Normal breath sounds. Abdominal:      General: Bowel sounds are normal.      Palpations: Abdomen is soft. Tenderness: There is no abdominal tenderness. Musculoskeletal:      Cervical back: Normal range of motion and neck supple. Lymphadenopathy:      Cervical: No cervical adenopathy. Skin:     General: Skin is warm and dry. Capillary Refill: Capillary refill takes less than 2 seconds. Findings: No rash. Neurological:      Mental Status: He is alert and oriented to person, place, and time. Psychiatric:         Mood and Affect: Mood is depressed. Thought Content: Thought content normal.         Judgment: Judgment normal.           All other labs were within normal range or not returned as of this dictation.     EMERGENCY DEPARTMENT COURSE and DIFFERENTIALDIAGNOSIS/MDM:   Vitals:    Vitals:    06/05/22 0619 06/05/22 0854   BP: 118/65 (!) 99/55   Pulse: 84 57   Resp: 16 16   Temp: 97.6 °F (36.4 °C)    TempSrc: Tympanic    SpO2: 100% 97%   Weight: 151 lb (68.5 kg)    Height: 5' 9\" (1.753 m)             Pt seen in room 10 on arrival for triage by ed. Pt has already been cleared by [facility] for psych eval. He has no new medical complaints for me. PROCEDURES:  Unless otherwise noted below, none     Procedures      FINAL IMPRESSION      1.  Current severe episode of major depressive disorder without psychotic features, unspecified whether recurrent [facility])          DISPOSITION/PLAN   DISPOSITION Decision To Admit 06/05/2022 02:17:32 PM          Ashley Rivera (electronically signed)  Attending Emergency Physician       Haven Valdez PA-C  06/05/22 5839

## 2022-06-05 NOTE — ED NOTES
Patient awoke for vitals. Staing that he us having no thoughts of suicide at home, Mother is here requesting patients Key card for dorm. Patient verbally gave permission for it to be given to her . Hospital police aware and will give her cuenca card. Eduardo Flood  06/05/22 4676

## 2022-06-05 NOTE — ED NOTES
Transfer center called to have ER doc in [facility] paged. Dr Juan Ramon Ragland accepted patient. Report called to charge nurse. Nate Gauthier.  Sulma Castro RN  06/05/22 7662

## 2022-06-05 NOTE — PROGRESS NOTES
Patient request Tylenol, voiced headache, rate 4/10, with 10 being the worst, medicated per request.

## 2022-06-05 NOTE — ED NOTES
Father at bedside with mother, and patient is verbalizing thoughts of wanting to kill himself. Dr Recinos Needs made aware. Amairani Dear.  Virgie Boeck, [facility]  06/05/22 0514

## 2022-06-05 NOTE — ED NOTES
Mother arrived at bedside, patient became very upset and began yelling. Mother states father is on his way as patient requested to see him. Maria Guadalupe Jay.  Irene Landau, RN  06/05/22 7816

## 2022-06-05 NOTE — ED PROVIDER NOTES
[facility] ED  eMERGENCY dEPARTMENT eNCOUnter      Pt Name: Ciara Sneed  MRN: [id number]  [place] 1995  Date of evaluation: 6/5/2022  Provider: Franceen Baumgarten, MD    CHIEF COMPLAINT       Alcohol intoxication. HISTORY OF PRESENT ILLNESS   (Location/Symptom, Timing/Onset,Context/Setting, Quality, Duration, Modifying Factors, Severity)  Note limiting factors. Ciara Sneed is a 27 y.o. male who presents to the emergency department with complaint of presenting squad with alcohol intoxication. He was drinking all night. [employer] security was called when he was found lying in the middle of the road acutely intoxicated. He was covered in his own vomit. Denies drugs. Smokes cigarettes. He was verbalizing that he will be missing his graduation today. Patient later began to threaten to kill himself. He made that known to his father and his his stepmother. Stepmother did inform us that patient had attempted suicide in the past.  [facility] was called and patient was accepted in transfer to the behavioral unit in the ED for continuing mental health care. HPI    Nursing Notes were reviewed. REVIEW OF SYSTEMS    (2-9 systems for level 4, 10 or more for level 5)     Review of Systems   Constitutional: Negative. Negative for activity change, appetite change, chills, fatigue and fever. HENT: Negative for congestion, ear discharge, ear pain, hearing loss, rhinorrhea, sinus pressure and sore throat. Eyes: Negative for photophobia, pain and visual disturbance. Respiratory: Negative for apnea, cough, shortness of breath and wheezing. Cardiovascular: Negative for chest pain, palpitations and leg swelling. Gastrointestinal: Negative for abdominal distention, abdominal pain, constipation, diarrhea, nausea and vomiting. Endocrine: Negative for cold intolerance, heat intolerance and polyuria. Genitourinary: Negative for dysuria, flank pain, frequency and urgency. Musculoskeletal: Negative for arthralgias, back pain, gait problem, myalgias and neck stiffness. Skin: Negative for color change, pallor, rash and wound. Allergic/Immunologic: Negative for food allergies and immunocompromised state. Neurological: Negative for dizziness, tremors, syncope, weakness, light-headedness and headaches. Psychiatric/Behavioral: Positive for confusion. Negative for agitation, hallucinations and suicidal ideas. All other systems reviewed and are negative. Except as noted above the remainder of the review of systems was reviewed and negative. PAST MEDICAL HISTORY     Past Medical History:   Diagnosis Date    Asthma          SURGICAL HISTORY     No past surgical history on file. CURRENT MEDICATIONS       Current Discharge Medication List      CONTINUE these medications which have NOT CHANGED    Details   diphenhydrAMINE (BENADRYL) 25 MG capsule Take 2 capsules by mouth every 6 hours as needed for Itching or Allergies (rash)  Qty: 20 capsule, Refills: 0      EPINEPHrine (EPIPEN 2-NANCY) 0.3 MG/0.3ML SOAJ injection Inject 0.3 mLs into the muscle once for 1 dose Use as directed for allergic reaction  Qty: 1 each, Refills: 1      montelukast (SINGULAIR) 10 MG tablet Take 10 mg by mouth nightly      albuterol sulfate  (90 BASE) MCG/ACT inhaler Inhale 2 puffs into the lungs every 6 hours as needed for Wheezing             ALLERGIES     Nut [peanut-containing drug products]    FAMILY HISTORY     No family history on file.        SOCIAL HISTORY       Social History     Socioeconomic History    Marital status: Single     Spouse name: Not on file    Number of children: Not on file    Years of education: Not on file    Highest education level: Not on file   Occupational History    Not on file   Tobacco Use    Smoking status: Current Every Day Smoker     Packs/day: 0.50     Types: Cigarettes    Smokeless tobacco: Never Used   Vaping Use    Vaping Use: Former Substance and Sexual Activity    Alcohol use: No    Drug use: Not Currently     Types: Marijuana Tamika Hebert     Comment: social    Sexual activity: Not on file   Other Topics Concern    Not on file   Social History Narrative    Not on file     Social Determinants of Health     Financial Resource Strain:     Difficulty of Paying Living Expenses: Not on file   Food Insecurity:     Worried About Running Out of Food in the Last Year: Not on file    Maxnie of Food in the Last Year: Not on file   Transportation Needs:     Lack of Transportation (Medical): Not on file    Lack of Transportation (Non-Medical): Not on file   Physical Activity:     Days of Exercise per Week: Not on file    Minutes of Exercise per Session: Not on file   Stress:     Feeling of Stress : Not on file   Social Connections:     Frequency of Communication with Friends and Family: Not on file    Frequency of Social Gatherings with Friends and Family: Not on file    Attends Yazdanism Services: Not on file    Active Member of [address] [place] [address] or Organizations: Not on file    Attends Club or Organization Meetings: Not on file    Marital Status: Not on file   Intimate Partner Violence:     Fear of Current or Ex-Partner: Not on file    Emotionally Abused: Not on file    Physically Abused: Not on file    Sexually Abused: Not on file   Housing Stability:     Unable to Pay for Housing in the Last Year: Not on file    Number of [place] in the Last Year: Not on file    Unstable Housing in the Last Year: Not on file       SCREENINGS    Gautam Coma Scale  Eye Opening: Spontaneous  Best Verbal Response: Oriented  Best Motor Response: Obeys commands  Gautam Coma Scale Score: 15        PHYSICAL EXAM    (up to 7 for level 4, 8 or more for level 5)     ED Triage Vitals [06/05/22 0424]   BP Temp Temp src Heart Rate Resp SpO2 Height Weight   110/69 -- -- 99 18 99 % 5' 8\" (1.727 m) --       Physical Exam  Vitals and nursing note reviewed. Constitutional:       General: He is not in acute distress. Appearance: Normal appearance. He is well-developed and normal weight. He is not ill-appearing, toxic-appearing or diaphoretic. Comments: Strong smell of alcohol on breath. Belligerent. HENT:      Head: Normocephalic and atraumatic. Nose: Nose normal. No congestion or rhinorrhea. Mouth/Throat:      Mouth: Mucous membranes are moist.      Pharynx: Oropharynx is clear. No oropharyngeal exudate or posterior oropharyngeal erythema. Eyes:      General: No scleral icterus. Right eye: No discharge. Left eye: No discharge. Extraocular Movements: Extraocular movements intact. Conjunctiva/sclera: Conjunctivae normal.      Pupils: Pupils are equal, round, and reactive to light. Neck:      Thyroid: No thyromegaly. Vascular: No carotid bruit or JVD. Trachea: No tracheal deviation. Cardiovascular:      Rate and Rhythm: Normal rate and regular rhythm. Pulses: Normal pulses. Heart sounds: Normal heart sounds. No murmur heard. No friction rub. No gallop. Pulmonary:      Effort: Pulmonary effort is normal. No respiratory distress. Breath sounds: Normal breath sounds. No stridor. No wheezing, rhonchi or rales. Chest:      Chest wall: No tenderness. Abdominal:      General: Abdomen is flat. Bowel sounds are normal. There is no distension. Palpations: Abdomen is soft. There is no mass. Tenderness: There is no abdominal tenderness. There is no right CVA tenderness, left CVA tenderness, guarding or rebound. Hernia: No hernia is present. Musculoskeletal:         General: No swelling, tenderness, deformity or signs of injury. Normal range of motion. Cervical back: Normal range of motion and neck supple. No rigidity or tenderness. Right lower leg: No edema. Left lower leg: No edema. Lymphadenopathy:      Cervical: No cervical adenopathy.    Skin:     General: Skin is warm and dry. Capillary Refill: Capillary refill takes less than 2 seconds. Coloration: Skin is not jaundiced or pale. Findings: No bruising, erythema, lesion or rash. Neurological:      General: No focal deficit present. Mental Status: He is alert and oriented to person, place, and time. Mental status is at baseline. Cranial Nerves: No cranial nerve deficit. Sensory: No sensory deficit. Motor: No weakness or abnormal muscle tone. Coordination: Coordination normal.      Gait: Gait normal.      Deep Tendon Reflexes: Reflexes are normal and symmetric. Reflexes normal.   Psychiatric:         Behavior: Behavior normal.         Thought Content: Thought content normal.         Judgment: Judgment normal.      Comments: Inebriated. DIAGNOSTIC RESULTS     EKG: All EKG's are interpreted by the Emergency Department Physician who either signs or Co-signs this chart in the absence of a cardiologist.        RADIOLOGY:   Non-plain film images such as CT, Ultrasound and MRI are read by the radiologist. Nelma Drop radiographicimages are visualized and preliminarily interpreted by the emergency physician with the below findings:        Interpretation per the Radiologist below, if available at the time of this note:    No orders to display         ED BEDSIDE ULTRASOUND:   Performed by ED Physician - none    LABS:  Labs Reviewed   COMPREHENSIVE METABOLIC PANEL - Abnormal; Notable for the following components:       Result Value    Chloride 109 (*)     Glucose 123 (*)     All other components within normal limits   ETHANOL   CBC WITH AUTO DIFFERENTIAL   MAGNESIUM   LIPASE   URINE DRUG SCREEN   URINALYSIS WITH REFLEX TO CULTURE       All other labs were within normal range or not returned as of this dictation.     EMERGENCY DEPARTMENT COURSE and DIFFERENTIALDIAGNOSIS/MDM:   Vitals:    Vitals:    06/05/22 0424   BP: 110/69   Pulse: 99   Resp: 18   SpO2: 99%   Height: 5' 8\" (1.727 m) MDM  Number of Diagnoses or Management Options     Amount and/or Complexity of Data Reviewed  Clinical lab tests: reviewed and ordered    Risk of Complications, Morbidity, and/or Mortality  Presenting problems: moderate  Diagnostic procedures: moderate  Management options: moderate    Patient Progress  Patient progress: improved      CRITICAL CARE TIME   Total Critical Care time was  minutes, excluding separately reportable procedures. There was a high probability of clinically significant/life threatening deterioration in the patient's condition which required my urgentintervention. CONSULTS:  None    PROCEDURES:  Unless otherwise noted below, none     Procedures    FINAL IMPRESSION      1. Acute alcoholic intoxication without complication ([facility] [address]    2.  Suicidal ideation          DISPOSITION/PLAN   DISPOSITION Decision To Transfer 06/05/2022 05:37:54 AM      PATIENT REFERRED TO:  MD Jose Mosher [facility] [id number] [phone]    In 3 days        DISCHARGE MEDICATIONS:  Current Discharge Medication List             (Please note that portions of this note were completed with a voice recognitionprogram.  Efforts were made to edit the dictations but occasionally words are mis-transcribed.)    Kelly Mims MD (electronically signed)  Attending Emergency Physician          Kelly Mims MD  06/05/22 [address], [place]  06/05/22 2806

## 2022-06-05 NOTE — ED NOTES
Collateral: Spoke with Parents, Yash Garcia [phone] and father Irvin Virk  [phone] and stepmother  Jeff Pavon [phone]. [de-identified] of Collateral from Mother- Hector Stanley. Patient had been attending [employer]. Patient also had been living for part of his time in college and using the practice  He was to have his graduation ceremony today. Mother lives in [place] . Patient has been drinking per mothers knowledge been binge drinking. He is aware she states that he may have a problem with alcohol . She states he has not been drinking daily and that last night was his first use of alcohol in three weeks. She states he has been counseling in [place] due to stressors of school and practice and his daily stressors. He has made suicidal statements, but only when inebriated to her knowledge and states he has never attempted to harm himself in anyway to her awareness. He had been in a serious relationship with his girlfriend and had been living with her [employer]. Patient is moving to [place], to  Attend to the [employer]. His girlfriend movinf to another state to do the same. Mother believes that the change in their relationship and if would continue or not may have been addressed and contributed to patients mind set. She states if he would want to put off moving to [place] and take a break, that was the plan for the summer,she would welcome him to do so and that his father would likely due the same at one of their homes. She states she does not feel that he would need inpatient hospitalization. \" I feel like if anything happens to get in the way of his career path it would be bad for him, (emotionally). Concepcion Ya  06/05/22 [address]  Dwayne Ya  06/05/22 4660

## 2022-06-05 NOTE — ED TRIAGE NOTES
Pt. Was brought [facility] by [facility] after [facility] was called after pt. Was found lying in the middle of the road and found the pt. Acutely intoxicated. Pt. Is drowsy but arousable, oriented x2-3. He is calm and cooperative but verbalizing regret that he is \"probably going to miss graduation,\"  And requesting that his father be called. Pt. Is covered in his own vomit, cleaned up by RN and medics. RN complies and calls Father, Ignacio Coyle, who is coming from a hotel in [facility].

## 2022-06-05 NOTE — ED NOTES
Patient changed into psych approved clothing   Skin assessment completed  COVID sent   Urine sent      Juanita Yee RN  06/05/22 8507

## 2022-06-05 NOTE — ED NOTES
Police called for belongings to be picked up. Lab called for blood draw.       Sena Steward RN  06/05/22 5412

## 2022-06-05 NOTE — ED NOTES
Patient stepmother called to give collateral on history. Per step mother, patient has had complaints of suicidal ideations on multiple occasions. She states before today, most recent was on mothers days, where he threatened multiple things related to suicidal. She state about one year ago, he held a loaded gun up to his head and threatened suicide, which laso happened as a teen. She states tonight he made a specific statement that he drank so much as a plan to kill himself, and specifically threatened that \"if locked up, I'll still find a way to do it\". Step mother states he has never had IP care before. She states the bio mom is very much in denial and regularly interferes and tries to prevent him from going. She states she really feel he needs IP care, as he wont get it on his own and other family doesn't support him doing so.      Osbaldo Allan RN  06/05/22 [address], RN  06/05/22 1024

## 2022-06-05 NOTE — PROGRESS NOTES
[facility] [place] Respiratory Therapy Evaluation   Current Order:  Alb q6 prn      Home Regimen: y      Ordering Physician: estiven  Re-evaluation Date:  eval done     Diagnosis: behavior disorder      Patient Status: Stable / Unstable + Physician notified    The following MDI Criteria must be met in order to convert aerosol to MDI with spacer. If unable to meet, MDI will be converted to aerosol:  []  Patient able to demonstrate the ability to use MDI effectively  []  Patient alert and cooperative  []  Patient able to take deep breath with 5-10 second hold  []  Medication(s) available in this delivery method   []  Peak flow greater than or equal to 200 ml/min            Current Order Substituted To  (same drug, same frequency)   Aerosol to MDI [] Albuterol Sulfate 0.083% unit dose by aerosol Albuterol Sulfate MDI 2 puffs by inhalation with spacer    [] Levalbuterol 1.25 mg unit dose by aerosol Levalbuterol MDI 2 puffs by inhalation with spacer    [] Levalbuterol 0.63 mg unit dose by aerosol Levalbuterol MDI 2 puffs by inhalation with spacer    [] Ipratropium Bromide 0.02% unit dose by aerosol Ipratropium Bromide MDI 2 puffs by inhalation with spacer    [] Duoneb (Ipratropium + Albuterol) unit dose by aerosol Ipratropium MDI + Albuterol MDI 2 puffs by inhalation w/spacer   MDI to Aerosol [] Albuterol Sulfate MDI Albuterol Sulfate 0.083% unit dose by aerosol    [] Levalbuterol MDI 2 puffs by inhalation Levalbuterol 1.25 mg unit dose by aerosol    [] Ipratropium Bromide MDI by inhalation Ipratropium Bromide 0.02% unit dose by aerosol    [] Combivent (Ipratropium + Albuterol) MDI by inhalation Duoneb (Ipratropium + Albuterol) unit dose by aerosol       Treatment Assessment [Frequency/Schedule]:  Change frequency to: ________no change__________________________________________per Protocol, P&T, MEC      Points 0 1 2 3 4   Pulmonary Status  Non-Smoker  []   Smoking history   < 20 pack years  []   Smoking history  ?  20 pack years  []   Pulmonary Disorder  (acute or chronic)  [x]   Severe or Chronic w/ Exacerbation  []     Surgical Status No [x]   Surgeries     General []   Surgery Lower []   Abdominal Thoracic or []   Upper Abdominal Thoracic with  PulmonaryDisorder  []     Chest X-ray Clear/Not  Ordered     [x]  Chronic Changes  Results Pending  []  Infiltrates, atelectasis, pleural effusion, or edema  []  Infiltrates in more than one lobe []  Infiltrate + Atelectasis, &/or pleural effusion  []    Respiratory Pattern Regular,  RR = 12-20 [x]  Increased,  RR = 21-25 []  NO, irregular,  or RR = 26-30 []  Decreased FEV1  or RR = 31-35 []  Severe SOB, use  of accessory muscles, or RR ? 35  []    Mental Status Alert, oriented,  Cooperative [x]  Confused but Follows commands []  Lethargic or unable to follow commands []  Obtunded  []  Comatose  []    Breath Sounds Clear to  auscultation  [x]  Decreased unilaterally or  in bases only []  Decreased  bilaterally  []  Crackles or intermittent wheezes []  Wheezes []    Cough Strong, Spontan., & nonproductive [x]  Strong,  spontaneous, &  productive []  Weak,  Nonproductive []  Weak, productive or  with wheezes []  No spontaneous  cough or may require suctioning []    Level of Activity Ambulatory [x]  Ambulatory w/ Assist  []  Non-ambulatory []  Paraplegic []  Quadriplegic []    Total    Score:___3____     Triage Score:___5_____      Tri       Triage:     1. (>20) Freq: Q3    2. (16-20) Freq: Q4   3. (11-15) Freq: QID & Albuterol Q2 PRN    4. (6-10) Freq: TID & Albuterol Q2 PRN    5. (0-5) Freq Q4prn

## 2022-06-05 NOTE — ED NOTES
Awakened easily for labs and EKG , Cooperative. Admits to heavy drinking, states he was \"celebrating graduation\" but also admits to feeling depressed due to having a \"fight with X-girlfrined\". [place] Da  Tod Bermeo  06/05/22 5032

## 2022-06-05 NOTE — ED NOTES
Provisional Diagnosis: Severe Major Depression    Psychosocial and Contextual Factors:  Pt was to attend his graduation ceremony at [employer] today. However after a fight with long term Girlfriend last night, he left his Dorm room and began drinking. He states he has no memory after leaving Dorm. 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 C-SSRS Summary:     Patient: C-SSRS Suicide Screening  1) Within the past month, have you wished you were dead or wished you could go to sleep and not wake up? : Yes  2) Have you actually had any thoughts of killing yourself? : Yes  3) Have you been thinking about how you might kill yourself? : No  4) Have you had these thoughts and had some intention of acting on them? : No  5) Have you started to work out or worked out the details of how to kill yourself? Do you intend to carry out this plan? : No  6) Have you ever done anything, started to do anything, or prepared to do anything to end your life?: No  Risk of Suicide: Low Risk    Family: See the Collateral Notes from 08:03 AM and 0959 AM    Agency: [facility] at [address] [place] [address]: Denies  Verbal Abuse: Denies  Emotional abuse: Denies  Financial Abuse: Denies  Sexual abuse: Denies    Clinical Summary:  Patient was Admitted to the ER at [facility] last night after Drinking Heavily. He told staff last night that he was attempting to drink himself to death,. Pt denies being suicidal at time of assessment or at arrival hear but admits that he believes he would have made that statement last night, ' I have been suicidal. I tried to throw myself out a Dorm window ( 6 stories)last month,  a year ago and a year ago I put my fathers gun to my head but I didn't load it right, I did that in high school to\" .  The police made me see the counselor at school after I tired to jump out the window but I have always been able to talk my way out of having to go to the hospital. Pt admits to occasional marijuana. Heavy drinking but states he has been sober for about 4 weeks until last night. Patient denies thoughts of harming others. He denies symptoms of psychosis and no overt s/s are noted. Level of Care Disposition:  Per Dr Brenda Worthington, admit to three [place]   Dx: Severe Major Depression     Kin Nguyen. [facility]  06/05/22 [address] [place] [address]  06/05/22 1421

## 2022-06-05 NOTE — PROGRESS NOTES
Patient arrived from ER via [facility] accompanied by staff. Skin and Contraband check were both negative.

## 2022-06-05 NOTE — PROGRESS NOTES
Christen presents very cooperative and sharing about events prior to this hospitalization. Most recently patient drank after one month sobriety and told many individuals about his desire to end his life. He was in a black out and had limited memory of events. Patient reports recent stressors include break up with SO of several years, and graduating from [employer]. He shares belief that he is Bipolar. He has suffered with depression since his teens. Mental illness runs in the family and so does addiction issues. Patient isolates intermittently for weeks at a time. Sleep is erratic and he can go days very sleep deprived. He describes having no self-regard. He shares  That he does not want to be around. He speaks of often over reacting to small things. He has lashed out and broke things but denies being violent towards people. He is intelligent and has realized that his alcohol use is out of control. That is why he was sober for one month since his last alcohol/suicidal event. Currently patient has much guilt about how this event affected family. Elderly relatives and many important family came in for his Commencements and he ended up hospitalized. 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 Patient is somber and wants help. Education initiated about Mental Illness and Substance abuse.

## 2022-06-05 NOTE — PROGRESS NOTES
Report received from [facility]   Patient was Admitted to the ER at [facility] last night after Drinking Heavily. He told staff last night that he was attempting to drink himself to death,. Pt denies being suicidal at time of assessment or at arrival hear but admits that he believes he would have made that statement last night, ' I have been suicidal. I tried to throw myself out a Dorm window ( 6 stories)last month,  a year ago and a year ago I put my fathers gun to my head but I didn't load it right, I did that in high school to\" . The police made me see the counselor at school after I tired to jump out the window but I have always been able to talk my way out of having to go to the hospital. Pt admits to occasional marijuana. Heavy drinking but states he has been sober for about 4 weeks until last night.   Patient denies thoughts of harming others. Navarro Adair denies symptoms of psychosis and no overt s/s are noted.

## 2022-06-06 LAB
EKG ATRIAL RATE: 73 BPM
EKG P AXIS: 54 DEGREES
EKG P-R INTERVAL: 188 MS
EKG Q-T INTERVAL: 382 MS
EKG QRS DURATION: 82 MS
EKG QTC CALCULATION (BAZETT): 420 MS
EKG R AXIS: 80 DEGREES
EKG T AXIS: 61 DEGREES
EKG VENTRICULAR RATE: 73 BPM

## 2022-06-06 PROCEDURE — 6370000000 HC RX 637 (ALT 250 FOR IP): Performed by: NURSE PRACTITIONER

## 2022-06-06 PROCEDURE — 93010 ELECTROCARDIOGRAM REPORT: CPT | Performed by: INTERNAL MEDICINE

## 2022-06-06 PROCEDURE — 99223 1ST HOSP IP/OBS HIGH 75: CPT | Performed by: PSYCHIATRY & NEUROLOGY

## 2022-06-06 PROCEDURE — 6370000000 HC RX 637 (ALT 250 FOR IP): Performed by: PSYCHIATRY & NEUROLOGY

## 2022-06-06 PROCEDURE — 1240000000 HC EMOTIONAL WELLNESS R&B

## 2022-06-06 RX ORDER — MONTELUKAST SODIUM 10 MG/1
10 TABLET ORAL NIGHTLY
Status: DISCONTINUED | OUTPATIENT
Start: 2022-06-06 | End: 2022-06-10 | Stop reason: HOSPADM

## 2022-06-06 RX ORDER — POLYETHYLENE GLYCOL 3350 17 G
4 POWDER IN PACKET (EA) ORAL
Status: DISCONTINUED | OUTPATIENT
Start: 2022-06-06 | End: 2022-06-10 | Stop reason: HOSPADM

## 2022-06-06 RX ADMIN — MONTELUKAST 10 MG: 10 TABLET, FILM COATED ORAL at 21:18

## 2022-06-06 RX ADMIN — NICOTINE POLACRILEX 4 MG: 4 LOZENGE ORAL at 16:02

## 2022-06-06 RX ADMIN — NICOTINE POLACRILEX 4 MG: 4 LOZENGE ORAL at 11:00

## 2022-06-06 RX ADMIN — TRAZODONE HYDROCHLORIDE 50 MG: 50 TABLET ORAL at 21:17

## 2022-06-06 RX ADMIN — NICOTINE POLACRILEX 4 MG: 4 LOZENGE ORAL at 20:01

## 2022-06-06 RX ADMIN — NICOTINE POLACRILEX 4 MG: 4 LOZENGE ORAL at 21:34

## 2022-06-06 NOTE — H&P
[address] [place] [address] - Department of Psychiatry    History and Physical - Adult         CHIEF COMPLAINT:  Depression SI    History obtained from:  patient    Patient was seen after discussing with the treatment team and reviewing the chart        CIRCUMSTANCES OF ADMISSION:     Patient was Admitted to the ER at [facility] last night after Drinking Heavily. He told staff last night that he was attempting to drink himself to death,. Pt denies being suicidal at time of assessment or at arrival hear but admits that he believes he would have made that statement last night, ' I have been suicidal. I tried to throw myself out a Dorm window ( 6 stories)last month,  a year ago and a year ago I put my fathers gun to my head but I didn't load it right, I did that in high school to\" . The police made me see the counselor at school after I tired to jump out the window but I have always been able to talk my way out of having to go to the hospital. Pt admits to occasional marijuana. Heavy drinking but states he has been sober for about 4 weeks until last night. Patient denies thoughts of harming others. He denies symptoms of psychosis and no overt s/s are noted. HISTORY OF PRESENT ILLNESS:      The patient is a 27 y.o. male graduate from [employer] with no significant past history of MDD    Pt seen therapist as teenager for emotional issues when he was at school. Since [place] time, he did not need any therapy or treatment. [place] was the graduation day. Was sober from alcohol for a month. Was drinking most days and more so the weekend. Weekdays- strong beer 4 or more, more so over the weekend. Was drinking on and off since 23, not this much. Started drinking this quantity from Dec 2021 until a month ago. Was stressed out doing grad school application, went home to [place] for xmas and started then which continued.   During the period of drinking, he was feeling depressed, erratic sleep, aggressive, agitation, mood swings, anger irritability, impulsive  Has noticed all this since his adult life but has not sought any help. Depression hits him hard when he cannot do anything, then go into better mood. People wanted him to get help, but pt dismissed it saying that he want to graduate and then seek help. Family did not know about this either. 2 years ago, while heavily intoxicated, tried to load the gun, put it to head, put did not go when he clicked it. Father knew about it. Split up with GF in May when he started drinking more, was suicidal, trying to jump off the window. Saw the counselor but was not taken to hospital. He was under the influence of alcohol    Quit drinking after this moment. Was doing good. Family were here for graduation party. Went to dinner Saturday night. Got a phone call from his ex GF, talked about some emotional things. Could not sleep, went to bar, had a few drinks, blacked out. Does not recall what he said, but he was suicidal    [de-identified] of Collateral from Mother- Priscila Wang. Patient had been attending [employer]. Patient also had been living for part of his time in college and using the practice  He was to have his graduation ceremony today. Mother lives in [place] . Patient has been drinking per mothers knowledge been binge drinking. He is aware she states that he may have a problem with alcohol . She states he has not been drinking daily and that last night was his first use of alcohol in three weeks. She states he has been counseling in [place] due to stressors of school and practice and his daily stressors. He has made suicidal statements, but only when inebriated to her knowledge and states he has never attempted to harm himself in anyway to her awareness. He had been in a serious relationship with his girlfriend and had been living with her [employer]. Patient is moving to [place], to  Attend to the [employer].  His girlfriend movinf to another state to do the same. Mother believes that the change in their relationship and if would continue or not may have been addressed and contributed to patients mind set. She states if he would want to put off moving to [place] and take a break, that was the plan for the summer,she would welcome him to do so and that his father would likely due the same at one of their homes. She states she does not feel that he would need inpatient hospitalization. \" I feel like if anything happens to get in the way of his career path it would be bad for him, (emotionally). Per step mother, patient has had complaints of suicidal ideations on multiple occasions. She states before today, most recent was on mothers days, where he threatened multiple things related to suicidal. She state about one year ago, he held a loaded gun up to his head and threatened suicide, which laso happened as a teen. She states tonight he made a specific statement that he drank so much as a plan to kill himself, and specifically threatened that \"if locked up, I'll still find a way to do it\". Step mother states he has never had IP care before. She states the bio mom is very much in denial and regularly interferes and tries to prevent him from going. She states she really feel he needs IP care, as he wont get it on his own and other family doesn't support him doing so. The patient is not currently receiving care for the above psychiatric illness.     Medications Prior to Admission:   Medications Prior to Admission: ondansetron (ZOFRAN ODT) 4 MG disintegrating tablet, Take 1 tablet by mouth every 8 hours as needed for Nausea  EPINEPHrine (EPIPEN 2-NANCY) 0.3 MG/0.3ML SOAJ injection, Inject 0.3 mLs into the muscle once for 1 dose Use as directed for allergic reaction  albuterol sulfate  (90 BASE) MCG/ACT inhaler, Inhale 2 puffs into the lungs every 6 hours as needed for Wheezing    Compliance:no    Psychiatric Review of Systems       Depression: yes Mary Grace or Hypomania:  yes - as described      Panic Attacks:  no     Phobias:  no     Obsessions and Compulsions:  no     PTSD : no     Hallucinations:  no     Delusions:  no    Substance Abuse History:  ETOH: yes as above   Marijuana: no  Opiates: no  Other Drugs: no      Past Psychiatric History:  Prior Diagnosis:  alcoholism  Psychiatrist: no  Therapist:no  Hospitalization: no  Hx of Suicidal Attempts: yes  Hx of violence:  no  ECT: no  Previous discontinued Psychiatric Med Trials: no    Past Medical History:        Diagnosis Date    Asthma        Past Surgical History:    History reviewed. No pertinent surgical history. Allergies:   Nut [peanut-containing drug products]    Family History  History reviewed. No pertinent family history. Social History:  Born and Raised: [place]  Describes Childhood:   supportive, parents   Education: College graduate  Relationships: single  Children: no children  Current Support: parents    Legal Hx: none  Access to weapons?:  No      EXAMINATION:    REVIEW OF SYSTEMS:    ROS:  [x] All negative/unchanged except if checked.  Explain positive(checked items) below:  [] Constitutional  [] Eyes  [] Ear/Nose/Mouth/Throat  [] Respiratory  [] CV  [] GI  []   [] Musculoskeletal  [] Skin/Breast  [] Neurological  [] Endocrine  [] Heme/Lymph  [] Allergic/Immunologic    Explanation:     Vitals:  /69   Pulse 64   Temp 97.9 °F (36.6 °C) (Oral)   Resp 18   Ht 5' 9\" (1.753 m)   Wt 151 lb (68.5 kg)   SpO2 98%   BMI 22.30 kg/m²      Neurologic Exam:   Muscle Strength & Tone: full ROM  Gait: normal gait   Involuntary Movements: No    Mental Status Examination:    Level of consciousness:  within normal limits   Appearance:  ill-appearing  Behavior/Motor:  psychomotor retardation  Attitude toward examiner:  cooperative  Speech:  slow   Mood: decreased range, depressed  Affect:  mood congruent  Thought processes:  goal directed   Association:normal  Thought content:  Suicidal Ideation:  passive  Delusions:  no evidence of delusions  Perceptual Disturbance:  denies any perceptual disturbance  Cognition:  oriented to person, place, and time   Attention & Concentration intact  Memory intact  Insight fair   Judgement fair   Fund of Knowledge adequate    Mini Mental Status 30/30      DIAGNOSIS:     Bipolar depression  Alcohol use disorder moderate to severe      RISK ASSESSMENT:    SUICIDE RISK ASSESSMENT:high  HOMICIDE: low  AGITATION/VIOLENCE: low  ELOPEMENT: low    LABS: REVIEWED TODAY:  Recent Labs     06/05/22  0440   WBC 8.2   HGB 15.4        Recent Labs     06/05/22  0440      K 3.9   *   CO2 20   BUN 11   CREATININE 0.72   GLUCOSE 123*     Recent Labs     06/05/22  0440   BILITOT 0.3   ALKPHOS 61   AST 21   ALT 21     Lab Results   Component Value Date    LABAMPH Neg 06/05/2022    BARBSCNU Neg 06/05/2022    LABBENZ Neg 06/05/2022    LABMETH Neg 06/05/2022    OPIATESCREENURINE Neg 06/05/2022    PHENCYCLIDINESCREENURINE Neg 06/05/2022    ETOH 113 06/05/2022     Lab Results   Component Value Date    TSH 0.458 06/05/2022     No results found for: LITHIUM  No results found for: VALPROATE, CBMZ  No results found for: LITHIUM, VALPROATE    FURTHER LABS ORDERED :      Radiology   No results found. EKG: TRACING REVIEWED    TREATMENT PLAN:    Risk Management:  close watch and suicide risk    This patient was assessed for Medical bed necessity for the following reason:  N/A    Collateral Information:  Will obtain collateral information from the family or friends. Will obtain medical records as appropriate from out patient providers  Will consult the hospitalist for a physical exam to rule out any co-morbid physical condition. Home medication Reconciled       New Medications started during this admission :    See orders  Prn Haldol 5mg and Vistaril 50mg q6hr for extreme agitation.   Trazodone as ordered for insomnia  Vistaril as ordered for anxiety  Discussed with the patient risk, benefit, alternative and common side effects for the  proposed medication treatment. Patient is consenting to the treatment.     Psychotherapy:   Encourage participation in milieu and group therapy  Individual therapy as needed      Electronically signed by Raimundo De La Cruz MD on 6/6/2022 at 9:54 AM

## 2022-06-06 NOTE — CARE COORDINATION
BHI Biopsychosocial Assessment    Current Level of Psychosocial Functioning     Independent x  Dependent    Minimal Assist     Comments:  Patient is a recent college graduate and plans to continue his education in [place]. He is a musician. Psychosocial High Risk Factors (check all that apply)    Unable to obtain meds   Chronic illness/pain    Substance abuse x (alcoholism)  Lack of Family Support   Financial stress   Isolation   Inadequate Community Resources  Suicide attempt(s)  Not taking medications   Victim of crime   Developmental Delay  Unable to manage personal needs    Age 72 or older   Homeless  No transportation   Readmission within 30 days  Unemployment x (recent college grad)  Traumatic Event    Comments: Patient has at least two high risk factors associated with this admission. Psychiatric Advanced Directives: None Reported. Family to Involve in Treatment: Patient provided his father's contact information to complete collateral. Marsha Shrestha [phone]. Sexual Orientation:  Patient is in neither a hetero or homosexual relationship at this time. Patient Strengths: Patient is bright, articulate, and well educated. Patient Barriers: Patient does not have a mental health provider at this time. Opiate Education Provided:  N/A    CMHC/mental health history: None Reported. Plan of Care   medication management, group/individual therapies, family meetings, psycho -education, treatment team meetings to assist with stabilization    Initial Discharge Plan:  Patient will return home and follow the recommendations of the treatment team.       Clinical Summary:    Patient is a 27year old male who was admitted to the [facility] due to threatening to kill himself. Reportedly, patient was acutely intoxicated and found lying in the middle of the road. When interviewed, patient was polite, articulate and mannerly.  He was forthcoming about his difficulties with alcohol and stated he planned to attend AA Meetings. Patient seemed remorseful about missing his graduation due to him being admitted to the hospital. Patient has plans to move home with family and pursue his graduate education in [place]. Patient seems committed to getting better and moving on with his life.      Electronically signed by Brie Ferrara on 6/6/2022 at 1:05 PM

## 2022-06-06 NOTE — CARE COORDINATION
Brief Intervention and Referral to Treatment Summary    Patient was provided PHQ-9, AUDIT-C and DAST Screening:      PHQ-9 Score: 17  AUDIT-C Score: 0   DAST Score: 0     Patients substance use is considered     Low Risk/Healthy x  Moderate Risk  Harmful  Dependent    Patients depression is considered:     Minimal  Mild   Moderate  Moderately Severe x  Severe    Brief Education Was Provided    Patient was receptive x  Patient was not receptive      Brief Intervention Is Provided (Only for AUDIT-C or DAST)     Patient reports readiness to decrease and/or stop use and a plan was discussed x  Patient denies readiness to decrease and/or stop use and a plan was not discussed      Recommendations/Referrals for Brief and/or Specialized Treatment Provided to Patient   Patient stated he has a problem with the over consumption of alcohol. He attends AA Meeting and will seek help when her returns home.      Electronically signed by Dwain Coronel on 6/6/2022 at 12:52 PM

## 2022-06-06 NOTE — PROGRESS NOTES
Pt assessment completed in day area. Pt noted to be out and social with peers this morning. Pt presents as bright, talkative, calm, and cooperative. Pt reports that he thinks the doctor diagnosed him with bipolar and he feels relieved to have some answers for his impulsive behavior. Pt states he can be impulsive at times and go from one extreme to another in a very short period of time. Pt appears hopeful that with treatment he will be able to manage his condition. Pt is future oriented and talks about plan to get his master's degree. Pt states he has a very good support system in his mom, dad, and step mother. Pt does state he sometimes feels like his mom is in denial about pt's mental health issues. Pt denies SI, HI, AVH.

## 2022-06-06 NOTE — PROGRESS NOTES
Pt assessed in the day room. Pt visible, bright, social.  Pt stated he had a \"good day\". Pt stated is ready to begin the recovery process and is ready to be discharged. Pt preoccupied with getting a nicotine lozenge. Pt denies SI/HI/AVH, anxiety and depression. Pt endorsed good sleep and appetitive. Pt stated he will take a shower after dinner.

## 2022-06-06 NOTE — CARE COORDINATION
Group Therapy Note    Date: 6/6/2022  Start Time: 0434  End Time:  1435    Number of Participants: 4    Type of Group: Cognitive Skills    Patient's Goal:  To participate in mood management group. Notes: Patient declined to attend psychoeducation group at [phone] despite encouragement by staff.      Discipline Responsible: /Counselor    AMY Sy

## 2022-06-06 NOTE — PROGRESS NOTES
Patient did not attend group despite staff encouragement.   Electronically signed by Jackie Freeman on 6/6/2022 at 1:40 PM

## 2022-06-06 NOTE — PROGRESS NOTES
Morning Community Meeting Topics    Francine Sams attended the morning community meeting on 6/6/22. Topics discussed today     [x] Introduction   Day of the week and date   Mask distribution   Current mask requirements  [x]Teams   Explanation of  Green and Blue team criteria   Nurses assigned to each team for today   Explanation about green and blue paper  o Date  o Patient's Name  o Patient's Nurse  o Goals  [x] Visitation   Announce the visiting hours for the day   Announce which team is allowed to have visitors for the day   Review any updated Covid 19 requirements for visitors during visitation  o Vaccine Card or negative Covid test within 48 hours of visit  o State Identification   Patients are reminded to alert the  at least 1 hour before visitation   [x] Unit Orientation   Coffee use   Phone location and etiquette   Shower locations  [employer] and dryer location and process   Common area expectations   Staff rounds expectation  [x] Meals    Educate patient to the menu  o The patient is encouraged to fill out the menu to get preferences at mealtime  o The patient is educated that if they do not fill out the menu, they will get the standard tray  o The coffee pot is decaf, patient encouraged to order regular coffee from menu.    Educate patient to the meal process   Patient encouraged to eat snacks provided twice daily  o Snacks may stay in patient room     [x] Discharge Process   Discharge expectations   Fill out the survey after discharge   [x] Hygiene   Daily showers encouraged  o Showers availability discussed    Daily dressing encouraged  o Discussed wearing street clothing   Education provided on where to place linens and clothing  o Linens in the hamper  o personal clothing does not go into the linen hamper  [x] Group    Patient encouraged to attend group provided   Time of Group Meetings discussed   Gentle reminder that attendance is a Physician order  [x] Movement   Chair exercises completed   Stretching completed  Notes: Goal - \"Talk to the Doctor and talk to someone on the phone\" Electronically signed by Soum, [address] on 6/6/2022 at 9:49 AM

## 2022-06-06 NOTE — GROUP NOTE
Group Therapy Note    Date: 6/6/2022    Group Start Time: 1105  Group End Time: 1200  Group Topic: Psychotherapy    [address]; [facility]        Group Therapy Note    Attendees: 12       Patient's Goal: to learn coping skills and resources    Notes:  Patient participated    Status After Intervention:  Improved    Participation Level: Interactive    Participation Quality: Attentive      Speech:  normal      Thought Process/Content: Logical      Affective Functioning: Congruent      Mood: anxious      Level of consciousness:  Alert      Response to Learning: Progressing to goal      Endings: None Reported    Modes of Intervention: Support      Discipline Responsible: /Counselor      Signature:  [facility]

## 2022-06-06 NOTE — PROGRESS NOTES
Patient had a flat affect but he was pleasant, cooperative and agreeable to so his leisure assessment. Patient stated he is depressed. Patient stated he was to have his graduation ceremony at [employer] but he missed it. He was sober from alcohol for a month but started drinking alcohol after he had a breakup from his girlfriend in May. He recently talk to her, stated he heard thing he didn't like and started drinking alcohol. He doesn't remember how he came to the hospital due being intoxicated. He does not remember if he felt suicidal. He denies any auditory or visual hallucinations. He does have a good support system. He is hopeful about his recovery. He likes to be outside, reading and music.  Electronically signed by Nilesh Hartmann, [address] on 6/6/2022 at 1:24 PM

## 2022-06-06 NOTE — CONSULTS
[facility]  MEDICINE    HISTORY AND PHYSICAL EXAM    PATIENT NAME:  Brian Solomon    MRN:  [id number]  SERVICE DATE:  6/6/2022   SERVICE TIME:  9:17 AM    Primary Care Physician: Cheryle Kaiser, MD     SUBJECTIVE  CHIEF COMPLAINT:  Medically appropriate for inpatient psychiatry admission. Consult for medical H/P encounter. HPI:  This is a 27 y.o. male who presents with PMHx moderate asthma  presented to emergency room with SI without specific plan. Has history previously holding a gun to his head. Patient cleared from emergency room for psychiatric care. Patient Seen, Chart, Labs, Radiologystudies, and Consults reviewed. Patient denies headache, chest pain, shortness of breath, N/V/D/C, fever/chills. PAST MEDICAL HISTORY:    Past Medical History:   Diagnosis Date    Asthma      PAST SURGICAL HISTORY:  History reviewed. No pertinent surgical history. FAMILY HISTORY:  History reviewed. No pertinent family history.   SOCIAL HISTORY:    Social History     Socioeconomic History    Marital status: Single     Spouse name: Not on file    Number of children: Not on file    Years of education: Not on file    Highest education level: Not on file   Occupational History    Not on file   Tobacco Use    Smoking status: Current Every Day Smoker     Packs/day: 0.50     Types: Cigarettes    Smokeless tobacco: Never Used   Vaping Use    Vaping Use: Former   Substance and Sexual Activity    Alcohol use: Yes     Comment: occasional    Drug use: Not Currently     Types: Marijuana Dhruvli Aldrich)     Comment: social    Sexual activity: Not on file   Other Topics Concern    Not on file   Social History Narrative    Not on file     Social Determinants of Health     Financial Resource Strain:     Difficulty of Paying Living Expenses: Not on file   Food Insecurity:     Worried About [address] in the Last Year: Not on file    Maxine of Food in the Last Year: Not on file   Transportation Needs:     Lack of Transportation (Medical): Not on file    Lack of Transportation (Non-Medical):  Not on file   Physical Activity:     Days of Exercise per Week: Not on file    Minutes of Exercise per Session: Not on file   Stress:     Feeling of Stress : Not on file   Social Connections:     Frequency of Communication with Friends and Family: Not on file    Frequency of Social Gatherings with Friends and Family: Not on file    Attends Caodaism Services: Not on file    Active Member of Clubs or Organizations: Not on file    Attends Club or Organization Meetings: Not on file    Marital Status: Not on file   Intimate Partner Violence:     Fear of Current or Ex-Partner: Not on file    Emotionally Abused: Not on file    Physically Abused: Not on file    Sexually Abused: Not on file   Housing Stability:     Unable to Pay for Housing in the Last Year: Not on file    Number of [place] in the Last Year: Not on file    Unstable Housing in the Last Year: Not on file     MEDICATIONS:    Current Facility-Administered Medications   Medication Dose Route Frequency Provider Last Rate Last Admin    albuterol sulfate  (90 Base) MCG/ACT inhaler 2 puff  2 puff Inhalation Q6H PRN Fara Duarte MD        acetaminophen (TYLENOL) tablet 650 mg  650 mg Oral Q4H PRN Fara Duarte MD   650 mg at 06/05/22 1719    magnesium hydroxide (MILK OF MAGNESIA) 400 MG/5ML suspension 30 mL  30 mL Oral Daily PRN Fara Duarte MD        aluminum & magnesium hydroxide-simethicone (MAALOX) 200-200-20 MG/5ML suspension 30 mL  30 mL Oral PRN Fara Duarte MD        haloperidol (HALDOL) tablet 5 mg  5 mg Oral Q6H PRN Fara Duarte MD        Or    haloperidol lactate (HALDOL) injection 5 mg  5 mg IntraMUSCular Q6H PRN Fara Duarte MD        benztropine mesylate (COGENTIN) injection 2 mg  2 mg IntraMUSCular BID PRN Fara Duarte MD        traZODone (DESYREL) tablet 50 mg  50 mg Oral Nightly PRN Celina Dc Bryce Ortez MD   50 mg at 06/05/22 0214    hydrOXYzine pamoate (VISTARIL) capsule 50 mg  50 mg Oral Q6H PRN Bell Johns MD        Or    hydrOXYzine (VISTARIL) injection 50 mg  50 mg IntraMUSCular Q6H PRN Bell Johns MD           ALLERGIES: Nut [peanut-containing drug products]    REVIEW OF SYSTEM:   ROS as noted in HPI, 12 point ROS reviewed and otherwise negative. OBJECTIVE  PHYSICAL EXAM: /71   Pulse 64   Temp 97.9 °F (36.6 °C) (Oral)   Resp 18   Ht 5' 9\" (1.753 m)   Wt 151 lb (68.5 kg)   SpO2 98%   BMI 22.30 kg/m²   CONSTITUTIONAL:  awake, alert, cooperative, no apparent distress, and appears stated age  EYES:  Lids and lashes normal, conjunctiva normal  ENT:  Normocephalic, without obvious abnormality, atraumatic, sinuses nontender on palpation, external ears without lesions, oral pharynx with moist mucus membranes, tonsils without erythema or exudates, gums normal and good dentition. NECK:  Supple, symmetrical, trachea midline, no adenopathy, thyroid symmetric, not enlarged and no tenderness, skin normal  LUNGS: Clear to auscultation bilaterally, no crackles or wheezing  CARDIOVASCULAR: Regular rate and rhythm, normal S1 and S2  ABDOMEN: Normal bowel sounds, soft, non-distended, non-tender  MUSCULOSKELETAL:  There is no redness, warmth, or swelling of the joints. NEUROLOGIC:  Awake, alert, oriented to name, place and time. SKIN:  Warm and dry    DATA:     Diagnostic tests reviewed for today's visit:    Most recent labs and imaging results reviewed. ASSESSMENT AND PLAN    Major depressive disorder, severe ([facility] [address]  Patient admitted to behavorial health for evaluation and treatment     Moderate persistent asthma: Resume home regimen. Albuterol PRN    This is only a history and physical examination and not medical management.  The patient is to contact and follow up with their primary care physician and go over any abnormal labs, imaging, findings, medical concerns, or conditions that we have and have not addressed during this encounter.     Plan of care discussed with: patient    SIGNATURE: Lynette Hatchet, APRN - NP  DATE: June 6, 2022  TIME: 9:17 AM

## 2022-06-06 NOTE — PROGRESS NOTES
Behavioral Services  Medicare Certification Upon Admission    I certify that this patient's inpatient psychiatric hospital admission is medically necessary for:    [x] (1) Treatment which could reasonably be expected to improve this patient's condition,       [x] (2) Or for diagnostic study;     AND     [x](2) The inpatient psychiatric services are provided while the individual is under the care of a physician and are included in the individualized plan of care.     Estimated length of stay/service 3-5 days    Plan for post-hospital care OP care    Electronically signed by Luis Penny MD on 6/6/2022 at 9:54 AM

## 2022-06-06 NOTE — GROUP NOTE
Group Therapy Note    Date: 6/6/2022    Group Start Time: 1000  Group End Time: 1050  Group Topic: Psychoeducation    [facility]    Contreras Yusuf        Group Therapy Note    Attendees: 13         Patient's Goal: \"Talk to the Doctor and to talk to someone on the phone\"    Notes:  Patient attended the 1000 skills group. Patient came to group late but he was attentive and he worked well on his task.     Status After Intervention:  Unchanged    Participation Level: Good    Participation Quality: Appropriate      Speech:  normal      Thought Process/Content: Linear      Affective Functioning: Flat      Mood: calm      Level of consciousness:  Alert      Response to Learning: Progressing to goal      Endings: None Reported    Modes of Intervention: Education, Socialization and Activity      Discipline Responsible: Psychoeducational Specialist      Signature:  Contreras Yusuf

## 2022-06-07 PROCEDURE — 1240000000 HC EMOTIONAL WELLNESS R&B

## 2022-06-07 PROCEDURE — 99232 SBSQ HOSP IP/OBS MODERATE 35: CPT | Performed by: PSYCHIATRY & NEUROLOGY

## 2022-06-07 PROCEDURE — 6370000000 HC RX 637 (ALT 250 FOR IP): Performed by: PSYCHIATRY & NEUROLOGY

## 2022-06-07 PROCEDURE — 6370000000 HC RX 637 (ALT 250 FOR IP): Performed by: NURSE PRACTITIONER

## 2022-06-07 RX ORDER — DIVALPROEX SODIUM 500 MG/1
500 TABLET, EXTENDED RELEASE ORAL NIGHTLY
Status: DISCONTINUED | OUTPATIENT
Start: 2022-06-07 | End: 2022-06-10 | Stop reason: HOSPADM

## 2022-06-07 RX ADMIN — NICOTINE POLACRILEX 4 MG: 4 LOZENGE ORAL at 17:51

## 2022-06-07 RX ADMIN — NICOTINE POLACRILEX 4 MG: 4 LOZENGE ORAL at 20:04

## 2022-06-07 RX ADMIN — NICOTINE POLACRILEX 4 MG: 4 LOZENGE ORAL at 09:36

## 2022-06-07 RX ADMIN — TRAZODONE HYDROCHLORIDE 50 MG: 50 TABLET ORAL at 21:04

## 2022-06-07 RX ADMIN — DIVALPROEX SODIUM 500 MG: 500 TABLET, EXTENDED RELEASE ORAL at 21:04

## 2022-06-07 RX ADMIN — NICOTINE POLACRILEX 4 MG: 4 LOZENGE ORAL at 13:05

## 2022-06-07 RX ADMIN — MONTELUKAST 10 MG: 10 TABLET, FILM COATED ORAL at 21:04

## 2022-06-07 NOTE — GROUP NOTE
Group Therapy Note    Date: 6/6/2022    Group Start Time: 2010  Group End Time: 2020  Group Topic: Wrap-Up    [facility]    Rocio Castro        Group Therapy Note    Attendees: 8/16         Patient's Goal:  \"to talk to the doctor and my family\"    Notes:  Patient reported meeting their goal for the day. Patient shared he made a friend on the unit today.     Status After Intervention:  Unchanged    Participation Level: Interactive    Participation Quality: Appropriate, Attentive and Sharing      Speech:  normal      Thought Process/Content: Logical      Affective Functioning: Congruent      Mood: euthymic      Level of consciousness:  Alert and Attentive      Response to Learning: Progressing to goal      Endings: None Reported    Modes of Intervention: Support      Discipline Responsible: [employer]      Signature:  Rocio Castro

## 2022-06-07 NOTE — GROUP NOTE
Group Therapy Note    Date: 6/7/2022    Group Start Time: 0215  Group End Time: 6888  Group Topic: Cognitive Skills    [facility]    THEO Patrick        Group Therapy Note    Attendees: 12/17         Patient's Goal:  \"to identify importance of self care and practice its use. \"    Notes:  n/a    Status After Intervention:  Improved    Participation Level:  Active Listener and Interactive    Participation Quality: Appropriate, Attentive, Sharing and Supportive      Speech:  normal      Thought Process/Content: Logical      Affective Functioning: Flat      Mood: anxious and depressed      Level of consciousness:  Alert      Response to Learning: Progressing to goal      Endings: None Reported    Modes of Intervention: Education      Discipline Responsible: /Counselor      Signature:  THEO Patrick

## 2022-06-07 NOTE — PROGRESS NOTES
[facility] FOLLOW-UP NOTE       6/7/2022     Patient was seen and examined in person, Chart reviewed   Patient's case discussed with staff/team    Chief Complaint: Depression, SI    Interim History:     Pt report feeling better  Still feel depressed but with the diagnosis of bipolar which he has often wondered but resisted to take further step, he feel relieved  Pt slept better  Has been talking with his parents  Thinking about all options including taking a break from his masters degree  Appetite:   [] Normal/Unchanged  [] Increased  [x] Decreased      Sleep:       [] Normal/Unchanged  [x] Fair       [] Poor              Energy:    [] Normal/Unchanged  [] Increased  [x] Decreased        SI [] Present  [x] Absent    HI  []Present  [x] Absent     Aggression:  [] yes  [x] no    Patient is [x] able  [] unable to CONTRACT FOR SAFETY     PAST MEDICAL/PSYCHIATRIC HISTORY:   Past Medical History:   Diagnosis Date    Asthma        FAMILY/SOCIAL HISTORY:  History reviewed. No pertinent family history.   Social History     Socioeconomic History    Marital status: Single     Spouse name: Not on file    Number of children: Not on file    Years of education: Not on file    Highest education level: Not on file   Occupational History    Not on file   Tobacco Use    Smoking status: Current Every Day Smoker     Packs/day: 0.50     Types: Cigarettes    Smokeless tobacco: Never Used   Vaping Use    Vaping Use: Former   Substance and Sexual Activity    Alcohol use: Yes     Comment: occasional    Drug use: Not Currently     Types: Marijuana Jett Cisneros     Comment: social    Sexual activity: Not on file   Other Topics Concern    Not on file   Social History Narrative    Not on file     Social Determinants of Health     Financial Resource Strain:     Difficulty of Paying Living Expenses: Not on file   Food Insecurity:     Worried About Running Out of Food in the Last Year: Not on file  Ran Out of Food in the Last Year: Not on file   Transportation Needs:     Lack of Transportation (Medical): Not on file    Lack of Transportation (Non-Medical): Not on file   Physical Activity:     Days of Exercise per Week: Not on file    Minutes of Exercise per Session: Not on file   Stress:     Feeling of Stress : Not on file   Social Connections:     Frequency of Communication with Friends and Family: Not on file    Frequency of Social Gatherings with Friends and Family: Not on file    Attends Yazdanism Services: Not on file    Active Member of [address] [place] [address] [employer] or Organizations: Not on file    Attends Club or Organization Meetings: Not on file    Marital Status: Not on file   Intimate Partner Violence:     Fear of Current or Ex-Partner: Not on file    Emotionally Abused: Not on file    Physically Abused: Not on file    Sexually Abused: Not on file   Housing Stability:     Unable to Pay for Housing in the Last Year: Not on file    Number of [place] in the Last Year: Not on file    Unstable Housing in the Last Year: Not on file           ROS:  [x] All negative/unchanged except if checked.  Explain positive(checked items) below:  [] Constitutional  [] Eyes  [] Ear/Nose/Mouth/Throat  [] Respiratory  [] CV  [] GI  []   [] Musculoskeletal  [] Skin/Breast  [] Neurological  [] Endocrine  [] Heme/Lymph  [] Allergic/Immunologic    Explanation:     MEDICATIONS:    Current Facility-Administered Medications:     EPINEPHrine 1 MG/ML injection 0.3 mg, 0.3 mg, IntraMUSCular, PRN, JABARI Torres NP    divalproex (DEPAKOTE ER) extended release tablet 500 mg, 500 mg, Oral, Nightly, Samuel Ramírez MD    montelukast (SINGULAIR) tablet 10 mg, 10 mg, Oral, Nightly, JABARI Torres NP, 10 mg at 06/06/22 2118    nicotine polacrilex (COMMIT) lozenge 4 mg, 4 mg, Oral, Q2H PRN, Samuel Ramírez MD, 4 mg at 06/07/22 1305    albuterol sulfate  (90 Base) MCG/ACT inhaler 2 puff, 2 puff, Inhalation, Q6H PRN, Victoria Adhikari MD    acetaminophen (TYLENOL) tablet 650 mg, 650 mg, Oral, Q4H PRN, Victoria Adhikari MD, 650 mg at 06/05/22 1719    magnesium hydroxide (MILK OF MAGNESIA) 400 MG/5ML suspension 30 mL, 30 mL, Oral, Daily PRN, Victoria Adhikari MD    aluminum & magnesium hydroxide-simethicone (MAALOX) 200-200-20 MG/5ML suspension 30 mL, 30 mL, Oral, PRN, Victoria Adhikari MD    haloperidol (HALDOL) tablet 5 mg, 5 mg, Oral, Q6H PRN **OR** haloperidol lactate (HALDOL) injection 5 mg, 5 mg, IntraMUSCular, Q6H PRN, Victoria Adhikari MD    benztropine mesylate (COGENTIN) injection 2 mg, 2 mg, IntraMUSCular, BID PRN, Victoria Adhikari MD    traZODone (DESYREL) tablet 50 mg, 50 mg, Oral, Nightly PRN, Victoria Adhikari MD, 50 mg at 06/06/22 2117    hydrOXYzine pamoate (VISTARIL) capsule 50 mg, 50 mg, Oral, Q6H PRN **OR** hydrOXYzine (VISTARIL) injection 50 mg, 50 mg, IntraMUSCular, Q6H PRN, Victoria Adhikari MD      Examination:  /71   Pulse 75   Temp 98.6 °F (37 °C)   Resp 19   Ht 5' 9\" (1.753 m)   Wt 151 lb (68.5 kg)   SpO2 98%   BMI 22.30 kg/m²   Gait - steady  Medication side effects(SE): no    Mental Status Examination:    Level of consciousness:  within normal limits   Appearance:  fair grooming and fair hygiene  Behavior/Motor:  psychomotor retardation  Attitude toward examiner:  cooperative  Speech:  slow   Mood: depressed  Affect:  blunted  Thought processes:  linear   Thought content:  Suicidal Ideation:  passive  Cognition:  oriented to person, place, and time   Concentration poor  Insight fair   Judgement fair     ASSESSMENT:   Patient symptoms are:  [] Well controlled  [] Improving  [] Worsening  [] No change      Diagnosis:   Bipolar depression    LABS:    Recent Labs     06/05/22  0440   WBC 8.2   HGB 15.4        Recent Labs     06/05/22  0440      K 3.9   *   CO2 20   BUN 11   CREATININE 0.72   GLUCOSE 123*     Recent Labs     06/05/22  0663 BILITOT 0.3   ALKPHOS 61   AST 21   ALT 21     Lab Results   Component Value Date    LABAMPH Neg 06/05/2022    BARBSCNU Neg 06/05/2022    LABBENZ Neg 06/05/2022    LABMETH Neg 06/05/2022    OPIATESCREENURINE Neg 06/05/2022    PHENCYCLIDINESCREENURINE Neg 06/05/2022    ETOH 113 06/05/2022     Lab Results   Component Value Date    TSH 0.458 06/05/2022     No results found for: LITHIUM  No results found for: VALPROATE, CBMZ    RISK ASSESSMENT: high risk of impulsivity    Treatment Plan:  Reviewed current Medications with the patient. Medication as ordered  Risks, benefits, side effects, drug-to-drug interactions and alternatives to treatment were discussed. Collateral information:   CD evaluation  Encourage patient to attend group and other milieu activities.   Discharge planning discussed with the patient and treatment team.    PSYCHOTHERAPY/COUNSELING:  [x] Therapeutic interview  [x] Supportive  [] CBT  [] Ongoing  [] Other    [x] Patient continues to need, on a daily basis, active treatment furnished directly by or requiring the supervision of inpatient psychiatric personnel      Anticipated Length of stay:            Electronically signed by Lois Montgomery MD on 6/7/2022 at 5:36 PM

## 2022-06-07 NOTE — GROUP NOTE
Group Therapy Note    Date: 6/7/2022    Group Start Time: 1000  Group End Time: 1050  Group Topic: Psychoeducation    [facility]    Omar Foley        Group Therapy Note    Attendees: 12         Patient's Goal:  \"Talk more in groups\"    Notes:  Patient attended the 1000 skills group. Patient was attentive, creative, he was relax and he worked actively on his task. Status After Intervention:  Improved    Participation Level:  Active Listener    Participation Quality: Appropriate and Attentive      Speech:  normal      Thought Process/Content: Logical      Affective Functioning: Congruent      Mood: calm      Level of consciousness:  Alert and Attentive      Response to Learning: Able to retain information      Endings: None Reported    Modes of Intervention: Education, Socialization and Activity      Discipline Responsible: Psychoeducational Specialist      Signature:  Omar Foley

## 2022-06-07 NOTE — GROUP NOTE
Group Therapy Note    Date: 6/6/2022    Group Start Time: 1940  Group End Time: 2010  Group Topic: Recreational    [facility]    Ignacia Butler        Group Therapy Note    Attendees: 9/16         Patient's Goal:  To play Wii game with the group. Notes:  Patient participated in group with peers. Status After Intervention:  Unchanged    Participation Level:  Active Listener    Participation Quality: Appropriate, Attentive and Supportive      Speech:  normal      Thought Process/Content: Logical      Affective Functioning: Congruent      Mood: euthymic      Level of consciousness:  Alert and Attentive      Response to Learning: Progressing to goal      Endings: None Reported    Modes of Intervention: Activity      Discipline Responsible: [address] [employer]      Signature:  Ignacia Butler

## 2022-06-07 NOTE — GROUP NOTE
Group Therapy Note    Date: 6/6/2022    Group Start Time: 1900  Group End Time: 1940  Group Topic: Healthy Living/Wellness    [facility]    Con [place]        Group Therapy Note    Attendees: 9/16         Patient's Goal:  To learn how coping skills affect mood. Notes:  Patient participated in group discussion after arriving to group late.     Status After Intervention:  Unchanged    Participation Level: Interactive    Participation Quality: Appropriate and Attentive      Speech:  normal      Thought Process/Content: Logical      Affective Functioning: Congruent      Mood: euthymic      Level of consciousness:  Alert and Attentive      Response to Learning: Progressing to goal      Endings: None Reported    Modes of Intervention: Education      Discipline Responsible: [address]      Signature:  Sreedhar Magana

## 2022-06-07 NOTE — GROUP NOTE
Group Therapy Note    Date: 6/7/2022    Group Start Time: 1630  Group End Time: 1700  Group Topic: Healthy Living/Wellness    [facility]    Gaby Mention        Group Therapy Note    Attendees: 9/19         Patient's Goal:  To learn about the benefits of and participate in a Progressive Muscle Relaxation. Notes:  Patient participated in group discussion and activity.      Status After Intervention:  Improved    Participation Level: Interactive    Participation Quality: Appropriate and Attentive      Speech:  normal      Thought Process/Content: Logical      Affective Functioning: Congruent      Mood: euthymic      Level of consciousness:  Alert and Attentive      Response to Learning: Progressing to goal      Endings: None Reported    Modes of Intervention: Education      Discipline Responsible: [address] [place]      Signature:  Gaby Mention Family

## 2022-06-07 NOTE — PROGRESS NOTES
Pt assessment completed in day room. Pt is out, bright, social, and friendly. Reports great sleep last night after receiving Trazodone. Denies any anxiety and depression. Denies SI, HI, AVH. States he very open to medication and treatment and wants to cooperate. States he wants to use his time on the unit to get better and is excited for the recovery process. Pt states he will be living with dad and step mom over the summer who are very supportive.

## 2022-06-07 NOTE — GROUP NOTE
Group Therapy Note    Date: 6/7/2022    Group Start Time: 1315  Group End Time: 1400  Group Topic: Healthy Living/Wellness    [facility]    Norma Rodriguez RN        Group Therapy Note    Attendees: 10       Patient's Goal:  To learn about communication skills    Notes:  Pt actively and appropriately participated in group    Status After Intervention:  Unchanged    Participation Level:  Active Listener and Interactive    Participation Quality: Appropriate, Attentive and Sharing      Speech:  normal      Thought Process/Content: Logical  Linear      Affective Functioning: Congruent      Mood: euthymic      Level of consciousness:  Alert and Oriented x4      Response to Learning: Able to verbalize current knowledge/experience, Able to verbalize/acknowledge new learning and Able to retain information      Endings: None Reported    Modes of Intervention: Education, Support, Socialization, Exploration and Activity      Discipline Responsible: Registered Nurse      Signature:  Norma Rodriguez RN

## 2022-06-07 NOTE — PROGRESS NOTES
Morning Community Meeting Topics    Mati Kapadia attended the morning community meeting on 6/7/22. Topics discussed today     [x] Introduction   Day of the week and date   Mask distribution   Current mask requirements  [x]Teams   Explanation of  Green and Blue team criteria   Nurses assigned to each team for today   Explanation about green and blue paper  o Date  o Patient's Name  o Patient's Nurse  o Goals  [x] Visitation   Announce the visiting hours for the day   Announce which team is allowed to have visitors for the day   Review any updated Covid 19 requirements for visitors during visitation  o Vaccine Card or negative Covid test within 48 hours of visit  o State Identification   Patients are reminded to alert the  at least 1 hour before visitation   [x] Unit Orientation   Coffee use   Phone location and etiquette   Shower locations  [employer] and dryer location and process   Common area expectations   Staff rounds expectation  [x] Meals    Educate patient to the menu  o The patient is encouraged to fill out the menu to get preferences at mealtime  o The patient is educated that if they do not fill out the menu, they will get the standard tray  o The coffee pot is decaf, patient encouraged to order regular coffee from menu.    Educate patient to the meal process   Patient encouraged to eat snacks provided twice daily  o Snacks may stay in patient room     [x] Discharge Process   Discharge expectations   Fill out the survey after discharge   [x] Hygiene   Daily showers encouraged  o Showers availability discussed    Daily dressing encouraged  o Discussed wearing street clothing   Education provided on where to place linens and clothing  o Linens in the hamper  o personal clothing does not go into the linen hamper  [x] Group    Patient encouraged to attend group provided   Time of Group Meetings discussed   Gentle reminder that attendance is a Physician order  [x] Movement   Chair exercises completed   Stretching completed  Notes: Goal - \"To talk more in groups\" Electronically signed by Nilesh Hartmann, [address] on 6/7/2022 at 9:51 AM

## 2022-06-07 NOTE — PROGRESS NOTES
Pt out on unit, social with a select peer. Pt acknowledges, mood swings, impulsive thoughts. Pt voiced, \"I do want to get, feel better. \" Pt reports showering today. Pt reports good appetite. Pt reports good sleep, Trazodone effective. Pt denies anxiety, depression @ this time. Pt denies SI, HI and A/V hallucinations. Will continue to monitor.

## 2022-06-07 NOTE — CARE COORDINATION
FAMILY COLLATERAL NOTE    Family/Support Name:  Alicia Rascon #: [phone]  Relationship to Pt[de-identified]  dad        Family/Support contact aware of hospitalization: yes       Top 3 Life Stressors:   Romantic relationship has caused pt to spiral.       Background History Relevant to Current Hospitalization:  \"maryellen has always been different. All the family members believe he may be on the spectrum. He always exhibiting OCD type behaviors such as obsessing over items such as fire engines and lined them up in a particular order prior to sleeping. When he woke up and they were moved, he cried, flipped out and wasn't able to regulate. Reports pt has always been very artistic, went to performing art high school. Started in visual arts claymation, movies, etc.. in 7th grade stopped the arts and started music and he was very talented out of no where. When pt was in high school, his first girlfriend she was his entire universe, relationship struggled. Pt \"was using suicide a tool\" to navigate this relationship. Reports he had a plastic pistol and the girlfriend's family called pt's parents in panic at 2am. Reports they started family therapy for 7 months. Pt shut down did not want to participate. Reports pt barely made it through high school but received scholarship through [employer]. Reports pt rehearse nonstop resulting in him not sleeping well and not attending school. School allowed him to graduate. Received 3 scholarships for his music, very intelligent, very charismatic, reports he is very good at creating what he wants people to see. Dad was concerned that pt was steering the situation for the diagnosis. \"  I am an incredibly stable professional, please take this serious, Maryellen's mother ( in 2003) is not supportive in pt's mental health. She cannot accept or embrace or entertain MH in her family or patient at all.  Pt's mother has a family history of two bipolar cousins and two suicides in the family. Reports pt's mom is a wonderful person but has been in denial regarding christen being on the spectrum. Christen has not been diagnosed with anything because she has not allowed him to get diagnosed with anything. He had to isolate from her for him to get help. \"  Bradford Marcial was aware the individuals dying by suicide. Reports once pt was at [employer] for 3 months, patient struggled and wanted to quit due to him not having the skills due to not trying in high school. Dad was in agreement but pt called next day and said he is was fine. Reports he is always up and down. Almost failing out and rebounding. Pt got into a relationship with [place]son, two years ago. Reports pt started meeting with a therapist and other [employer] college staff were helping him be successful. Reports pt got completely obsessed with trying to \"win her. \" reports he got very intoxicated at a party she was at and [place] told him that it was not appropriate. Reports he showed up at the family farm two hours away from [employer] with no knowledge to the family. Reports pt came in acting like everything is fine. [place] calls [facility] and asked if patient was okay. [facility] what she was doing and christen was sending [place] pictures of him with dad's firearms in his mouth from the bedroom upstairs. The entire family rushes to patient to help him. Reports that he told his family that the gun misfired, reports this would of not happened because gun is always loaded so he did lie about this but reports that he did pose with the firearms. Pt's stepmom is wanting to get patient help through [facility] services/hospitalization. Pt's mom fell apart and took patient with her so they could not get him help. Reports two weeks later, patient is living with [place]. Dad thinks he used suicide to get [place].  Reports they were together for 2 years, reports he is the best verison of himself for the entire relationship. Reports responsible, working, cooking, grades are stabilizing, etc. Believes whatever was wrong ended. Mid April 2022, family received a phone call from patient, he was in [place] and [place] was in [address] [place] [address]. Still dating but not physically together. Reports they have worked everything through and they had worked together to end the relationship and this has been accepted by both parties. Reports following this, communication was skethcy, not taking calls or returning calls, voicemail is full.he was required to complete a tom recital, patient was at hospital, he was exposed to peanuts dad believes this is his way of getting out of the recital. He got this requirement lifted. Reports mothers day of 2022,  Reports they received a call from christina who received a call from [employer] and he is very intoxicated, removed the screens from 6th floor window and is suicidal. [place] police arrive to patients apartments and tried to contain him. Josep Bailon was suicide again. \" patient states that he is allergic to alcohol and that he started going to AA. Reports [place] was at [place] after the window incident, pt is back on track, everything is fine. He has his recitial on 5/27 and reports it was a perfect performance. Reports there is a pattern of being perfect then trying to kill himself. The [facility] and his graduation, he was \"great\" doing everything he needs to do. Friends and family come for graduation, all meet for dinner Saturday night. Reports this is the best he has ever seen him physically. But reports pt was very manic and discussing his post grad life. Dad received call at 430am in the morning from hospital that he was found in the street with very very high BAL and was \"out of his mind. \" pt reports that he has been planning this for years and that he never see the age 29. Family cleaned the entire apartment, there was nothing disturbing, no drug, no alcohol anything.  Just \"typical college kid stuff. \" pt had a hand written sign written over his desk stating \"do not offer me alcohol. \"       Family Mental Health/Substance Use History:   Maternal grandma: daily marijuana user,   Mom: daily marijuana use starting at age 9. Raised no restrictions. Mom was raised with no boundaries, had  tough childhood. Support Network's Goal for Hospitalization: for him to get stabilized and put on medications. Discharge Plan: discharge to dad's farm with stepmom. Link with outpatient services       Support Network Supportive of Discharge Plan: in agreement       Support can confirm Safety of Location and Security of Weapons: father is getting a gun safe so patient will have no access to the firearms. Support agreeable to Safeguard and Monitor Medications (including Prescription and OTC): agreeable to safeguard and monitor medications. Identified Barriers to Compliance with Discharge Plan: limited insight.      Recommendations for Support Network: be available follow up         THEO Ta

## 2022-06-07 NOTE — GROUP NOTE
Group Therapy Note    Date: 6/7/2022    Group Start Time: 1105  Group End Time: 0027  Group Topic: Psychotherapy    [facility]    Silvia [place], [facility]        Group Therapy Note    Attendees: 10         Patient's Goal: to learn coping skills     Notes:  Patient participated    Status After Intervention:  Improved    Participation Level: Interactive    Participation Quality: Appropriate      Speech:  normal      Thought Process/Content: Logical      Affective Functioning: Congruent      Mood: anxious      Level of consciousness:  Alert      Response to Learning: Progressing to goal      Endings: None Reported    Modes of Intervention: Support      Discipline Responsible: /Counselor      Signature:  Dalia Bernal, [facility]

## 2022-06-08 PROCEDURE — 99232 SBSQ HOSP IP/OBS MODERATE 35: CPT | Performed by: PSYCHIATRY & NEUROLOGY

## 2022-06-08 PROCEDURE — 6370000000 HC RX 637 (ALT 250 FOR IP): Performed by: NURSE PRACTITIONER

## 2022-06-08 PROCEDURE — 1240000000 HC EMOTIONAL WELLNESS R&B

## 2022-06-08 PROCEDURE — 6370000000 HC RX 637 (ALT 250 FOR IP): Performed by: PSYCHIATRY & NEUROLOGY

## 2022-06-08 PROCEDURE — 90833 PSYTX W PT W E/M 30 MIN: CPT | Performed by: PSYCHIATRY & NEUROLOGY

## 2022-06-08 RX ORDER — LANOLIN ALCOHOL/MO/W.PET/CERES
6 CREAM (GRAM) TOPICAL NIGHTLY PRN
Status: DISCONTINUED | OUTPATIENT
Start: 2022-06-08 | End: 2022-06-10 | Stop reason: HOSPADM

## 2022-06-08 RX ADMIN — NICOTINE POLACRILEX 4 MG: 4 LOZENGE ORAL at 16:31

## 2022-06-08 RX ADMIN — Medication 6 MG: at 20:56

## 2022-06-08 RX ADMIN — DIVALPROEX SODIUM 500 MG: 500 TABLET, EXTENDED RELEASE ORAL at 20:56

## 2022-06-08 RX ADMIN — NICOTINE POLACRILEX 4 MG: 4 LOZENGE ORAL at 12:32

## 2022-06-08 RX ADMIN — MONTELUKAST 10 MG: 10 TABLET, FILM COATED ORAL at 20:56

## 2022-06-08 RX ADMIN — NICOTINE POLACRILEX 4 MG: 4 LOZENGE ORAL at 18:47

## 2022-06-08 RX ADMIN — HYDROXYZINE PAMOATE 50 MG: 50 CAPSULE ORAL at 18:47

## 2022-06-08 RX ADMIN — NICOTINE POLACRILEX 4 MG: 4 LOZENGE ORAL at 09:10

## 2022-06-08 NOTE — PROGRESS NOTES
Patient's parents called and are concerned that the patient will not be ready to come home on Friday. They are interested in a transitional therapy stay somewhere that the patient can get stabilized on his medications and have is medical/psychological needs met for a couple of weeks. Parents advise the last time this happened, the patient \"seemed fine\" until he ended up with a 9mm gun that was loaded to his head. Patient's parents state they live on a farm and are making arrangements for him to come home and stay for awhile; however, they have not gotten a gun safe yet. This nurse advised she would share these concerns with social work.

## 2022-06-08 NOTE — PROGRESS NOTES
Patient is out and social on the unit. He is very friendly. Patient rates is anxiety 2/10 and depression 0/10 on a 10 point scale where 10 is the worst.  Patient states the trazodone and depakote make him drowsy, but he is starting to feel more clarity this morning. Patient reports he just graduated from [employer] and plans on going to graduate school in [place]. He reports a good support system with his family. Patient would like to continue with AA meetings upon discharge and reports he finds those helpful. Patient's family called and are concerned about him. Concerns forwarded to . Patient has a brightened affect and maintains good eye contact. Patient physical was WNL. LBM was yesterday. Patient denies needs at this time. Will continue to monitor.

## 2022-06-08 NOTE — PROGRESS NOTES
Morning Community Meeting Topics    Rox Marin attended the morning community meeting on 6/8/22. Topics discussed today     [x] Introduction   Day of the week and date   Mask distribution   Current mask requirements  [x]Teams   Explanation of  Green and Blue team criteria   Nurses assigned to each team for today   Explanation about green and blue paper  o Date  o Patient's Name  o Patient's Nurse  o Goals  [x] Visitation   Announce the visiting hours for the day   Announce which team is allowed to have visitors for the day   Review any updated Covid 19 requirements for visitors during visitation  o Vaccine Card or negative Covid test within 48 hours of visit  o State Identification   Patients are reminded to alert the  at least 1 hour before visitation   [x] Unit Orientation   Coffee use   Phone location and etiquette   Shower locations  [employer] and dryer location and process   Common area expectations   Staff rounds expectation  [x] Meals    Educate patient to the menu  o The patient is encouraged to fill out the menu to get preferences at mealtime  o The patient is educated that if they do not fill out the menu, they will get the standard tray  o The coffee pot is decaf, patient encouraged to order regular coffee from menu.    Educate patient to the meal process   Patient encouraged to eat snacks provided twice daily  o Snacks may stay in patient room     [x] Discharge Process   Discharge expectations   Fill out the survey after discharge   [x] Hygiene   Daily showers encouraged  o Showers availability discussed    Daily dressing encouraged  o Discussed wearing street clothing   Education provided on where to place linens and clothing  o Linens in the hamper  o personal clothing does not go into the linen hamper  [x] Group    Patient encouraged to attend group provided   Time of Group Meetings discussed   Gentle reminder that attendance is a Physician order  [x] Movement   Chair exercises completed   Stretching completed  Notes: Goal - \"figure out how my medication affect me in a positive way\" Electronically signed by REYMUNDO Oliver on 6/8/2022 at 12:53 PM

## 2022-06-08 NOTE — GROUP NOTE
Group Therapy Note    Date: 6/8/2022    Group Start Time: 1000  Group End Time: 1050  Group Topic: Psychoeducation    [facility]        Group Therapy Note    Attendees: 8         Patient's Goal:  \"To figure out how my medication affect me in a positive way\"    Notes:  Patient attended the 1000 skills group. Patient was attentive, creative and he worked diligently on his task. Status After Intervention:  Improved    Participation Level:  Active Listener    Participation Quality: Appropriate      Speech:  normal      Thought Process/Content: Logical      Affective Functioning: Congruent      Mood: calm      Level of consciousness:  Alert      Response to Learning: Able to retain information      Endings: None Reported    Modes of Intervention: Education, Socialization and Activity      Discipline Responsible: Psychoeducational Specialist      Signature:  Aliza Bower

## 2022-06-08 NOTE — PROGRESS NOTES
Lili Thakkar [facility]. FOLLOW-UP NOTE       6/8/2022     Patient was seen and examined in person, Chart reviewed   Patient's case discussed with staff/team    Chief Complaint: Depression    Interim History:     Pt report feeling better  Less depressed  No active SI or HI  Looking into his future plans  Working with his family  Insightful into his condition and want to address the problems  Appetite:   [x] Normal/Unchanged  [] Increased  [] Decreased      Sleep:       [] Normal/Unchanged  [x] Fair       [] Poor              Energy:    [x] Normal/Unchanged  [] Increased  [] Decreased        SI [] Present  [x] Absent    HI  []Present  [x] Absent     Aggression:  [] yes  [x] no    Patient is [x] able  [] unable to CONTRACT FOR SAFETY     PAST MEDICAL/PSYCHIATRIC HISTORY:   Past Medical History:   Diagnosis Date    Asthma        FAMILY/SOCIAL HISTORY:  History reviewed. No pertinent family history. Social History     Socioeconomic History    Marital status: Single     Spouse name: Not on file    Number of children: Not on file    Years of education: Not on file    Highest education level: Not on file   Occupational History    Not on file   Tobacco Use    Smoking status: Current Every Day Smoker     Packs/day: 0.50     Types: Cigarettes    Smokeless tobacco: Never Used   Vaping Use    Vaping Use: Former   Substance and Sexual Activity    Alcohol use: Yes     Comment: occasional    Drug use: Not Currently     Types: Marijuana Garon Kettle)     Comment: social    Sexual activity: Not on file   Other Topics Concern    Not on file   Social History Narrative    Not on file     Social Determinants of Health     Financial Resource Strain:     Difficulty of Paying Living Expenses: Not on file   Food Insecurity:     Worried About [address] in the Last Year: Not on file    Maxine of Food in the Last Year: Not on file   Transportation Needs:     Lack of Transportation (Medical):  Not on file    Lack of Transportation (Non-Medical): Not on file   Physical Activity:     Days of Exercise per Week: Not on file    Minutes of Exercise per Session: Not on file   Stress:     Feeling of Stress : Not on file   Social Connections:     Frequency of Communication with Friends and Family: Not on file    Frequency of Social Gatherings with Friends and Family: Not on file    Attends Restorationist Services: Not on file    Active Member of [address] [place] [address] [place] or Organizations: Not on file    Attends Club or Organization Meetings: Not on file    Marital Status: Not on file   Intimate Partner Violence:     Fear of Current or Ex-Partner: Not on file    Emotionally Abused: Not on file    Physically Abused: Not on file    Sexually Abused: Not on file   Housing Stability:     Unable to Pay for Housing in the Last Year: Not on file    Number of [place] in the Last Year: Not on file    Unstable Housing in the Last Year: Not on file           ROS:  [x] All negative/unchanged except if checked.  Explain positive(checked items) below:  [] Constitutional  [] Eyes  [] Ear/Nose/Mouth/Throat  [] Respiratory  [] CV  [] GI  []   [] Musculoskeletal  [] Skin/Breast  [] Neurological  [] Endocrine  [] Heme/Lymph  [] Allergic/Immunologic    Explanation:     MEDICATIONS:    Current Facility-Administered Medications:     melatonin tablet 6 mg, 6 mg, Oral, Nightly PRN, Victoria Adhikari MD    EPINEPHrine 1 MG/ML injection 0.3 mg, 0.3 mg, IntraMUSCular, PRN, Thom Lompoc, APRN - NP    divalproex (DEPAKOTE ER) extended release tablet 500 mg, 500 mg, Oral, Nightly, Victoria Adhikari MD, 500 mg at 06/07/22 2104    montelukast (SINGULAIR) tablet 10 mg, 10 mg, Oral, Nightly, Thom Lompoc, APRN - NP, 10 mg at 06/07/22 2104    nicotine polacrilex (COMMIT) lozenge 4 mg, 4 mg, Oral, Q2H PRN, Victoria Adhikari MD, 4 mg at 06/08/22 1232    albuterol sulfate  (90 Base) MCG/ACT inhaler 2 puff, 2 puff, Inhalation, Q6H PRN, Darrick Contreras MD    acetaminophen (TYLENOL) tablet 650 mg, 650 mg, Oral, Q4H PRN, Darrick Contreras MD, 650 mg at 06/05/22 1719    magnesium hydroxide (MILK OF MAGNESIA) 400 MG/5ML suspension 30 mL, 30 mL, Oral, Daily PRN, Darrick Contreras MD    aluminum & magnesium hydroxide-simethicone (MAALOX) 200-200-20 MG/5ML suspension 30 mL, 30 mL, Oral, PRN, Darrick Contreras MD    haloperidol (HALDOL) tablet 5 mg, 5 mg, Oral, Q6H PRN **OR** haloperidol lactate (HALDOL) injection 5 mg, 5 mg, IntraMUSCular, Q6H PRN, Darrick Contreras MD    benztropine mesylate (COGENTIN) injection 2 mg, 2 mg, IntraMUSCular, BID PRN, Darrick Contreras MD    hydrOXYzine pamoate (VISTARIL) capsule 50 mg, 50 mg, Oral, Q6H PRN **OR** hydrOXYzine (VISTARIL) injection 50 mg, 50 mg, IntraMUSCular, Q6H PRN, Darrick Contreras MD      Examination:  /65   Pulse 71   Temp 98.4 °F (36.9 °C) (Oral)   Resp 20   Ht 5' 9\" (1.753 m)   Wt 151 lb (68.5 kg)   SpO2 98%   BMI 22.30 kg/m²   Gait - steady  Medication side effects(SE): no    Mental Status Examination:    Level of consciousness:  within normal limits   Appearance:  fair grooming and fair hygiene  Behavior/Motor:  no abnormalities noted  Attitude toward examiner:  cooperative  Speech:  normal rate   Mood: anxious  Affect:  mood congruent  Thought processes:  goal directed   Thought content:  Suicidal Ideation:  denies suicidal ideation  Cognition:  oriented to person, place, and time   Concentration intact  Insight good   Judgement fair     ASSESSMENT:   Patient symptoms are:  [] Well controlled  [] Improving  [] Worsening  [] No change      Diagnosis:   Bipolar depression  Alcohol use disorder  LABS:    No results for input(s): WBC, HGB, PLT in the last 72 hours. No results for input(s): NA, K, CL, CO2, BUN, CREATININE, GLUCOSE in the last 72 hours. No results for input(s): BILITOT, ALKPHOS, AST, ALT in the last 72 hours.   Lab Results   Component Value Date    LABAMPH Neg 06/05/2022    BARBSCNU Neg 06/05/2022    LABBENZ Neg 06/05/2022    LABMETH Neg 06/05/2022    OPIATESCREENURINE Neg 06/05/2022    PHENCYCLIDINESCREENURINE Neg 06/05/2022    ETOH 113 06/05/2022     Lab Results   Component Value Date    TSH 0.458 06/05/2022     No results found for: LITHIUM  No results found for: VALPROATE, CBMZ      Treatment Plan:  Reviewed current Medications with the patient. Depakote level on Friday  D/C friday  Risks, benefits, side effects, drug-to-drug interactions and alternatives to treatment were discussed. Collateral information: reviewed  CD evaluation  Encourage patient to attend group and other milieu activities. Discharge planning discussed with the patient and treatment team.    PSYCHOTHERAPY/COUNSELING:  [x] Therapeutic interview  [x] Supportive  [] CBT  [] Ongoing  [] Other  Patient was seen 1:1 for 20 minutes, other than E&M time spent, focusing on      - coping skills techniques     - Anxiety management techniques discussed including deep breathing exercise and PMR     - discussing patients strength and weakness      - Motivational interviewing to assess the stage of change and assessing patient readiness to quit substance use.      - Focusing on negative cognition and maladaptive thoughts, which is feeding and maintaining the depression symptoms        [x] Patient continues to need, on a daily basis, active treatment furnished directly by or requiring the supervision of inpatient psychiatric personnel      Anticipated Length of stay:            Electronically signed by Lois Montgomery MD on 6/8/2022 at 12:51 PM

## 2022-06-08 NOTE — PROGRESS NOTES
Patient request Vistaril, voiced anxiety, rate 6/10, with 10 being the worst, medicated per request.

## 2022-06-08 NOTE — GROUP NOTE
Group Therapy Note    Date: 6/8/2022    Group Start Time: 1400  Group End Time: 1430  Group Topic: Cognitive Skills    [facility]    AMY Gilbert        Group Therapy Note    Attendees: 10         Patient's Goal:  To participate in mood management group. Notes:  Patient learned to create an action plan to help with difficult feelings. Status After Intervention:  Improved    Participation Level: Active Listener    Participation Quality: Appropriate      Speech:  normal      Thought Process/Content: Logical      Affective Functioning: Congruent      Mood: elevated      Level of consciousness:  Alert      Response to Learning: Able to verbalize current knowledge/experience      Endings: None Reported    Modes of Intervention: Education      Discipline Responsible: /Counselor      Signature:   AMY Gilbert

## 2022-06-08 NOTE — PROGRESS NOTES
Pt out on unit, social with peers, observed watching Television in dayroom. Pt brightened affect, voiced goal, \"to learn more about my medication,\" met, 'I talked to the doctor today. \" Pt voiced upon discharge,Friday, will follow up outpatient therapy. Pt denies anxiety, depression. Pt reports showering today. Pt reports good appetite. Pt reports good sleep, Trazodone effective. Pt reports attending groups. Pt denies SI, HI and A/V hallucinations. Will continue to monitor.

## 2022-06-08 NOTE — GROUP NOTE
Group Therapy Note    Date: 6/8/2022    Group Start Time: 1640  Group End Time: 1193  Group Topic: Healthy Living/Wellness    [facility]    Rocio Castro        Group Therapy Note    Attendees: 7/17         Patient's Goal:  To learn about self esteem and its importance. Notes:  Patient participated in group discussion.      Status After Intervention:  Improved    Participation Level: Interactive    Participation Quality: Appropriate, Attentive, Sharing and Supportive      Speech:  normal      Thought Process/Content: Logical      Affective Functioning: Congruent      Mood: euthymic      Level of consciousness:  Alert and Attentive      Response to Learning: Able to verbalize current knowledge/experience      Endings: None Reported    Modes of Intervention: Education      Discipline Responsible: [address]      Signature:  Rocio Castro

## 2022-06-08 NOTE — GROUP NOTE
Group Therapy Note    Date: 6/7/2022    Group Start Time: 1950  Group End Time: 2000  Group Topic: Wrap-Up    [facility] YOLANDA Mejia; Evert Matthews RN        Group Therapy Note    Attendees: 8/20         Patient's Goal:  \"to talk more in group\"    Notes:  Patient reported meeting their goal for the day. Patient shared he enjoyed playing corn hole today.     Status After Intervention:  Unchanged    Participation Level: Interactive    Participation Quality: Appropriate, Attentive and Sharing      Speech:  normal      Thought Process/Content: Logical      Affective Functioning: Congruent      Mood: euthymic      Level of consciousness:  Alert and Attentive      Response to Learning: Progressing to goal      Endings: None Reported    Modes of Intervention: Support      Discipline Responsible: [employer]      Signature:  Herberth Mejia

## 2022-06-08 NOTE — PROGRESS NOTES
Patient reinforcement, rule, no physical contact between peers. This nurse observed Patient hugging a male peer. Patient verbalized understanding.

## 2022-06-09 VITALS
HEART RATE: 69 BPM | RESPIRATION RATE: 20 BRPM | SYSTOLIC BLOOD PRESSURE: 117 MMHG | OXYGEN SATURATION: 100 % | TEMPERATURE: 98.6 F | BODY MASS INDEX: 22.36 KG/M2 | HEIGHT: 69 IN | DIASTOLIC BLOOD PRESSURE: 73 MMHG | WEIGHT: 151 LBS

## 2022-06-09 LAB
GLUCOSE BLD-MCNC: 101 MG/DL (ref 70–99)
PERFORMED ON: ABNORMAL

## 2022-06-09 PROCEDURE — 99232 SBSQ HOSP IP/OBS MODERATE 35: CPT | Performed by: PSYCHIATRY & NEUROLOGY

## 2022-06-09 PROCEDURE — 6370000000 HC RX 637 (ALT 250 FOR IP): Performed by: PSYCHIATRY & NEUROLOGY

## 2022-06-09 PROCEDURE — 90833 PSYTX W PT W E/M 30 MIN: CPT | Performed by: PSYCHIATRY & NEUROLOGY

## 2022-06-09 PROCEDURE — 6370000000 HC RX 637 (ALT 250 FOR IP): Performed by: NURSE PRACTITIONER

## 2022-06-09 PROCEDURE — 1240000000 HC EMOTIONAL WELLNESS R&B

## 2022-06-09 RX ADMIN — DIVALPROEX SODIUM 500 MG: 500 TABLET, EXTENDED RELEASE ORAL at 20:52

## 2022-06-09 RX ADMIN — NICOTINE POLACRILEX 4 MG: 4 LOZENGE ORAL at 08:37

## 2022-06-09 RX ADMIN — NICOTINE POLACRILEX 4 MG: 4 LOZENGE ORAL at 19:36

## 2022-06-09 RX ADMIN — MONTELUKAST 10 MG: 10 TABLET, FILM COATED ORAL at 20:52

## 2022-06-09 RX ADMIN — NICOTINE POLACRILEX 4 MG: 4 LOZENGE ORAL at 12:30

## 2022-06-09 RX ADMIN — Medication 6 MG: at 20:52

## 2022-06-09 RX ADMIN — NICOTINE POLACRILEX 4 MG: 4 LOZENGE ORAL at 14:55

## 2022-06-09 NOTE — GROUP NOTE
Group Therapy Note    Date: 6/8/2022    Group Start Time: 2120  Group End Time: 2130  Group Topic: Wrap-Up    [facility]    Gaby Mention        Group Therapy Note    Attendees: 9/17         Patient's Goal:  Oscar Brookesmith more about my meds\"    Notes:  Patient reported meeting their goal for the day. Patient shared he enjoyed watching a friend get discharged today.     Status After Intervention:  Unchanged    Participation Level: Interactive    Participation Quality: Appropriate, Attentive and Sharing      Speech:  normal      Thought Process/Content: Logical      Affective Functioning: Congruent      Mood: euthymic      Level of consciousness:  Alert and Attentive      Response to Learning: Progressing to goal      Endings: None Reported    Modes of Intervention: Support      Discipline Responsible: [employer]      Signature:  Gaby Gramajo

## 2022-06-09 NOTE — PROGRESS NOTES
[facility] [address] FOLLOW-UP NOTE       6/9/2022     Patient was seen and examined in person, Chart reviewed   Patient's case discussed with staff/team    Chief Complaint: Depression    Interim History:     Pt denies any depressive symptoms  No active SI or HI  Looking into his future plans  Working with his family  Insightful into his condition and want to address the problems  Looking forward to go home tomorrow  All his belongings from [place] has been taken back home  Appetite:   [x] Normal/Unchanged  [] Increased  [] Decreased      Sleep:       [] Normal/Unchanged  [x] Fair       [] Poor              Energy:    [x] Normal/Unchanged  [] Increased  [] Decreased        SI [] Present  [x] Absent    HI  []Present  [x] Absent     Aggression:  [] yes  [x] no    Patient is [x] able  [] unable to CONTRACT FOR SAFETY     PAST MEDICAL/PSYCHIATRIC HISTORY:   Past Medical History:   Diagnosis Date    Asthma        FAMILY/SOCIAL HISTORY:  History reviewed. No pertinent family history.   Social History     Socioeconomic History    Marital status: Single     Spouse name: Not on file    Number of children: Not on file    Years of education: Not on file    Highest education level: Not on file   Occupational History    Not on file   Tobacco Use    Smoking status: Current Every Day Smoker     Packs/day: 0.50     Types: Cigarettes    Smokeless tobacco: Never Used   Vaping Use    Vaping Use: Former   Substance and Sexual Activity    Alcohol use: Yes     Comment: occasional    Drug use: Not Currently     Types: Marijuana Jarvis Lassiter     Comment: social    Sexual activity: Not on file   Other Topics Concern    Not on file   Social History Narrative    Not on file     Social Determinants of Health     Financial Resource Strain:     Difficulty of Paying Living Expenses: Not on file   Food Insecurity:     Worried About [address] in the Last Year: Not on file    [address] in the Last Year: Not on file   Transportation Needs:     Lack of Transportation (Medical): Not on file    Lack of Transportation (Non-Medical): Not on file   Physical Activity:     Days of Exercise per Week: Not on file    Minutes of Exercise per Session: Not on file   Stress:     Feeling of Stress : Not on file   Social Connections:     Frequency of Communication with Friends and Family: Not on file    Frequency of Social Gatherings with Friends and Family: Not on file    Attends Worship Services: Not on file    Active Member of [address] [place] [address] Arden [employer] or Organizations: Not on file    Attends Club or Organization Meetings: Not on file    Marital Status: Not on file   Intimate Partner Violence:     Fear of Current or Ex-Partner: Not on file    Emotionally Abused: Not on file    Physically Abused: Not on file    Sexually Abused: Not on file   Housing Stability:     Unable to Pay for Housing in the Last Year: Not on file    Number of [place] in the Last Year: Not on file    Unstable Housing in the Last Year: Not on file           ROS:  [x] All negative/unchanged except if checked.  Explain positive(checked items) below:  [] Constitutional  [] Eyes  [] Ear/Nose/Mouth/Throat  [] Respiratory  [] CV  [] GI  []   [] Musculoskeletal  [] Skin/Breast  [] Neurological  [] Endocrine  [] Heme/Lymph  [] Allergic/Immunologic    Explanation:     MEDICATIONS:    Current Facility-Administered Medications:     melatonin tablet 6 mg, 6 mg, Oral, Nightly PRN, Magaly Flowers MD, 6 mg at 06/08/22 2056    EPINEPHrine 1 MG/ML injection 0.3 mg, 0.3 mg, IntraMUSCular, PRN, JABARI Parisi NP    divalproex (DEPAKOTE ER) extended release tablet 500 mg, 500 mg, Oral, Nightly, Magaly Flowers MD, 500 mg at 06/08/22 2056    montelukast (SINGULAIR) tablet 10 mg, 10 mg, Oral, Nightly, JABARI Parisi NP, 10 mg at 06/08/22 2056    nicotine polacrilex (COMMIT) lozenge 4 mg, 4 mg, Oral, Q2H PRN, Magaly Flowers MD, 4 mg at 06/09/22 1455    albuterol sulfate  (90 Base) MCG/ACT inhaler 2 puff, 2 puff, Inhalation, Q6H PRN, Vinetta Goodpasture, MD    acetaminophen (TYLENOL) tablet 650 mg, 650 mg, Oral, Q4H PRN, Vinetta Goodpasture, MD, 650 mg at 06/05/22 1719    magnesium hydroxide (MILK OF MAGNESIA) 400 MG/5ML suspension 30 mL, 30 mL, Oral, Daily PRN, Vinetta Goodpasture, MD    aluminum & magnesium hydroxide-simethicone (MAALOX) 200-200-20 MG/5ML suspension 30 mL, 30 mL, Oral, PRN, Vinetta Goodpasture, MD    haloperidol (HALDOL) tablet 5 mg, 5 mg, Oral, Q6H PRN **OR** haloperidol lactate (HALDOL) injection 5 mg, 5 mg, IntraMUSCular, Q6H PRN, Vinetta Goodpasture, MD    benztropine mesylate (COGENTIN) injection 2 mg, 2 mg, IntraMUSCular, BID PRN, Vinetta Goodpasture, MD    hydrOXYzine pamoate (VISTARIL) capsule 50 mg, 50 mg, Oral, Q6H PRN, 50 mg at 06/08/22 1847 **OR** hydrOXYzine (VISTARIL) injection 50 mg, 50 mg, IntraMUSCular, Q6H PRN, Vinetta Goodpasture, MD      Examination:  /73   Pulse 69   Temp 98.6 °F (37 °C) (Oral)   Resp 20   Ht 5' 9\" (1.753 m)   Wt 151 lb (68.5 kg)   SpO2 100%   BMI 22.30 kg/m²   Gait - steady  Medication side effects(SE): no    Mental Status Examination:    Level of consciousness:  within normal limits   Appearance:  fair grooming and fair hygiene  Behavior/Motor:  no abnormalities noted  Attitude toward examiner:  cooperative  Speech:  normal rate   Mood: anxious  Affect:  mood congruent  Thought processes:  goal directed   Thought content:  Suicidal Ideation:  denies suicidal ideation  Cognition:  oriented to person, place, and time   Concentration intact  Insight good   Judgement fair     ASSESSMENT:   Patient symptoms are:  [] Well controlled  [] Improving  [] Worsening  [] No change      Diagnosis:   Bipolar depression  Alcohol use disorder  LABS:    No results for input(s): WBC, HGB, PLT in the last 72 hours.   No results for input(s): NA, K, CL, CO2, BUN, CREATININE, GLUCOSE in the last 72 hours. No results for input(s): BILITOT, ALKPHOS, AST, ALT in the last 72 hours. Lab Results   Component Value Date    LABAMPH Neg 06/05/2022    BARBSCNU Neg 06/05/2022    LABBENZ Neg 06/05/2022    LABMETH Neg 06/05/2022    OPIATESCREENURINE Neg 06/05/2022    PHENCYCLIDINESCREENURINE Neg 06/05/2022    ETOH 113 06/05/2022     Lab Results   Component Value Date    TSH 0.458 06/05/2022     No results found for: LITHIUM  No results found for: VALPROATE, CBMZ      Treatment Plan:  Reviewed current Medications with the patient. Depakote level on Friday  D/C friday  Risks, benefits, side effects, drug-to-drug interactions and alternatives to treatment were discussed. Collateral information: reviewed  CD evaluation  Encourage patient to attend group and other milieu activities. Discharge planning discussed with the patient and treatment team.    PSYCHOTHERAPY/COUNSELING:  [x] Therapeutic interview  [x] Supportive  [] CBT  [] Ongoing  [] Other  Patient was seen 1:1 for 20 minutes, other than E&M time spent, focusing on      - coping skills techniques     - Anxiety management techniques discussed including deep breathing exercise and PMR     - discussing patients strength and weakness      - Motivational interviewing to assess the stage of change and assessing patient readiness to quit substance use.      - Focusing on negative cognition and maladaptive thoughts, which is feeding and maintaining the depression symptoms        [x] Patient continues to need, on a daily basis, active treatment furnished directly by or requiring the supervision of inpatient psychiatric personnel      Anticipated Length of stay:            Electronically signed by Caitlyn Marquez MD on 6/9/2022 at 4:27 PM

## 2022-06-09 NOTE — CARE COORDINATION
Spoke to patients father to review discharge plan for tomorrow. Made father aware that patient was being discharged tomorrow. He wanted to know if patient could be transferred to an unlocked unit for further stabilization for a few weeks. Made dad aware that there are no units that would keep patient for a few weeks. Also let him know that patient wants to go home and doctor is considering him stable for discharge tomorrow but the home needs to be safe and a safety plan needs to be put in place. Discussed safety plan with father. Father is purchasing a gun safe today and all guns will be locked with patient having no access. All the alcohol will be removed from the home. Dad switched his work day to stay at home so patient will be monitored at all times. All meds will be safeguarded and monitored. Made dad aware that patient is at higher risk if he uses alcohol. Dad is ok with patients mom picking him up tomorrow and transporting him to the [facility] home. They will call back with a  time that is after 11am.     Father provided information on what cities he would like outpt apptmts scheduled. Provided UR with this info.

## 2022-06-09 NOTE — GROUP NOTE
Group Therapy Note    Date: 6/9/2022    Group Start Time: 1000  Group End Time: 1050  Group Topic: Psychoeducation    [facility] YOLANDA Dennis        Group Therapy Note    Attendees: 10         Patient's Goal:  \"To be more in the moment\"    Notes:  Patient attended the 1000 skills group. Patient was relax, he focused well on his task and overall participation was good. Status After Intervention:  Improved    Participation Level:  Active Listener    Participation Quality: Appropriate and Attentive      Speech:  normal      Thought Process/Content: Logical  Linear      Affective Functioning: Congruent      Mood: calm      Level of consciousness:  Alert      Response to Learning: Able to retain information      Endings: None Reported    Modes of Intervention: Education, Socialization and Activity      Discipline Responsible: Psychoeducational Specialist      Signature:  Tiffany Briones

## 2022-06-09 NOTE — GROUP NOTE
Group Therapy Note    Date: 6/9/2022    Group Start Time: 1100  Group End Time: 1200  Group Topic: Psychoeducation    [facility] SANJAY Albarado, JUAN DAVIDW        Group Therapy Note    Attendees: 9         Patient's Goal:  To participate in Psychoeducational group    Notes:  Patient participated in a healthy boundaries exercise    Status After Intervention:  Improved    Participation Level: Interactive    Participation Quality: Appropriate      Speech:  normal      Thought Process/Content: Logical      Affective Functioning: Congruent      Mood: calm      Level of consciousness:  Alert      Response to Learning: Able to verbalize current knowledge/experience      Endings: None Reported    Modes of Intervention: Education      Discipline Responsible: /Counselor      Signature:  SANJAY Gaines, ALEISHA

## 2022-06-09 NOTE — CARE COORDINATION
Left [facility]icemail for [facility] and [facility] requesting return call to link with services upon discharge.  Electronically signed by THEO Brooks on 6/9/2022 at 11:27 AM

## 2022-06-09 NOTE — PROGRESS NOTES
Dad called and would like an update. Kermit Schwab. Advised that patient's father would need to speak with social work. This nurse advised father that social work would have to give him a call back as they were with another patient at that time.

## 2022-06-09 NOTE — PROGRESS NOTES
Patient did not attend group despite staff encouragement.   Electronically signed by Felix Miner on 6/9/2022 at 5:06 PM

## 2022-06-09 NOTE — PROGRESS NOTES
Patient is future oriented and goal directed. He shares that he is relieved to have a diagnosis and learning about what he has struggled with for years. He is not suicidal or homicidal. He recognizes his increased risk if he drinks and is committed to sobriety and safety. He is hopeful for discharge tomorrow. He knows his dad and step mom are worried, and wants to help them as well.

## 2022-06-09 NOTE — GROUP NOTE
Group Therapy Note    Date: 6/8/2022    Group Start Time: 2050  Group End Time: 2120  Group Topic: Recreational    [facility]    Sebastian Adams        Group Therapy Note    Attendees: 9/17         Patient's Goal:  To play Wii Nascentricling with the group. Notes:  Patient played the game with peers.      Status After Intervention:  Improved    Participation Level: Interactive    Participation Quality: Appropriate, Attentive and Supportive      Speech:  normal      Thought Process/Content: Logical      Affective Functioning: Congruent      Mood: euthymic      Level of consciousness:  Alert and Attentive      Response to Learning: Progressing to goal      Endings: None Reported    Modes of Intervention: Activity      Discipline Responsible: [place]      Signature:  Sebastian Adams

## 2022-06-09 NOTE — PROGRESS NOTES
Morning Community Meeting Topics    Babs Bertha attended the morning community meeting on 6/9/22. Topics discussed today     [x] Introduction   Day of the week and date   Mask distribution   Current mask requirements  [x]Teams   Explanation of  Green and Blue team criteria   Nurses assigned to each team for today   Explanation about green and blue paper  o Date  o Patient's Name  o Patient's Nurse  o Goals  [x] Visitation   Announce the visiting hours for the day   Announce which team is allowed to have visitors for the day   Review any updated Covid 19 requirements for visitors during visitation  o Vaccine Card or negative Covid test within 48 hours of visit  o State Identification   Patients are reminded to alert the  at least 1 hour before visitation   [x] Unit Orientation   Coffee use   Phone location and etiquette   Shower locations  [employer] and dryer location and process   Common area expectations   Staff rounds expectation  [x] Meals    Educate patient to the menu  o The patient is encouraged to fill out the menu to get preferences at mealtime  o The patient is educated that if they do not fill out the menu, they will get the standard tray  o The coffee pot is decaf, patient encouraged to order regular coffee from menu.    Educate patient to the meal process   Patient encouraged to eat snacks provided twice daily  o Snacks may stay in patient room     [x] Discharge Process   Discharge expectations   Fill out the survey after discharge   [x] Hygiene   Daily showers encouraged  o Showers availability discussed    Daily dressing encouraged  o Discussed wearing street clothing   Education provided on where to place linens and clothing  o Linens in the hamper  o personal clothing does not go into the linen hamper  [x] Group    Patient encouraged to attend group provided   Time of Group Meetings discussed   Gentle reminder that attendance is a Physician order  [x] Movement   Chair exercises completed   Stretching completed  Notes: Goal - \"To be in the moment\" Electronically signed by REYMUNDO Stinson on 6/9/2022 at 9:49 AM

## 2022-06-09 NOTE — CARE COORDINATION
Momchristina # [phone] called and stated that there is a good plan between both parents home. Mom has no weapons and no alcohol in the home. Mom works three days a week and patient can be at the home on the days she is there. Mom said [place] would be ideal for outpt. She was hoping someone who specializes in CBT or ISS. Mom can take patient to apptmts if its a Wed or Sat.

## 2022-06-09 NOTE — PROGRESS NOTES
Pt out on, social with peers, brightened affect. Pt voiced, \"today is a good day,\"  upon discharge tomorrow, will reunite with parents, positive support. Pt reports showering today. Pt reports good appetite. Pt reports good sleep. Pt denies anxiety, depression. Pt reports attending groups. Pt denies SI, HI and A/V hallucinations. Will continue to monitor.

## 2022-06-10 PROBLEM — F31.9 BIPOLAR DEPRESSION (HCC): Status: ACTIVE | Noted: 2022-06-05

## 2022-06-10 LAB — VALPROIC ACID LEVEL: 23.2 UG/ML (ref 50–100)

## 2022-06-10 PROCEDURE — 99239 HOSP IP/OBS DSCHRG MGMT >30: CPT | Performed by: PSYCHIATRY & NEUROLOGY

## 2022-06-10 PROCEDURE — 6370000000 HC RX 637 (ALT 250 FOR IP): Performed by: PSYCHIATRY & NEUROLOGY

## 2022-06-10 PROCEDURE — 80164 ASSAY DIPROPYLACETIC ACD TOT: CPT

## 2022-06-10 PROCEDURE — 36415 COLL VENOUS BLD VENIPUNCTURE: CPT

## 2022-06-10 RX ORDER — DIVALPROEX SODIUM 500 MG/1
500 TABLET, EXTENDED RELEASE ORAL NIGHTLY
Qty: 15 TABLET | Refills: 3 | Status: SHIPPED | OUTPATIENT
Start: 2022-06-10

## 2022-06-10 RX ORDER — LANOLIN ALCOHOL/MO/W.PET/CERES
6 CREAM (GRAM) TOPICAL NIGHTLY PRN
Qty: 15 TABLET | Refills: 3 | Status: SHIPPED | OUTPATIENT
Start: 2022-06-10

## 2022-06-10 RX ADMIN — NICOTINE POLACRILEX 4 MG: 4 LOZENGE ORAL at 09:10

## 2022-06-10 NOTE — GROUP NOTE
Group Therapy Note    Date: 6/9/2022    Group Start Time: 2000  Group End Time: 2130  Group Topic: Recreational    [facility]    Claudell Humble        Group Therapy Note    Attendees: 9/16         Patient's Goal:  To play Heads Up with the group. Notes:  Patient played the game with peers.      Status After Intervention:  Improved    Participation Level: Interactive    Participation Quality: Appropriate and Attentive      Speech:  normal      Thought Process/Content: Logical      Affective Functioning: Congruent      Mood: euthymic      Level of consciousness:  Alert and Attentive      Response to Learning: Progressing to goal      Endings: None Reported    Modes of Intervention: Activity      Discipline Responsible: exsulin      Signature:  Claudell Humble

## 2022-06-10 NOTE — PROGRESS NOTES
Pt left unit with staff, escorted to lobby and collected by family for ride home. Belongings and discharge paperwork given to pt including scripts.

## 2022-06-10 NOTE — PROGRESS NOTES
Discharge instructions reviewed verbally and in writing including f/u appointments, medication and safety plan. . Patient verbalizes understanding and signed as such. All belongings returned for discharge. Patient denies SI, HI, A/V hallucinations, mood is stable.

## 2022-06-10 NOTE — DISCHARGE SUMMARY
DISCHARGE SUMMARY      Patient ID:  Doreen Manuel  [id number]  89 y.o.  1995      Admit date: 6/5/2022    Discharge date and time: 6/10/2022    Admitting Physician: Radu Hyde MD     Discharge Physician: Dr Angela Grewal MD    Admission Diagnoses: Current severe episode of major depressive disorder without psychotic features, unspecified whether recurrent ([facility] [F32.2]  Major depressive disorder, severe ([facility] [F32.2]    Admission Condition: poor    Discharged Condition: stable    Admission Circumstance:     Patient was Admitted to the ER at [facility] last night after Drinking Heavily. He told staff last night that he was attempting to drink himself to death,. Pt denies being suicidal at time of assessment or at arrival hear but admits that he believes he would have made that statement last night, ' I have been suicidal. I tried to throw myself out a Dorm window ( 6 stories)last month,  a year ago and a year ago I put my fathers gun to my head but I didn't load it right, I did that in high school to\" . The police made me see the counselor at school after I tired to jump out the window but I have always been able to talk my way out of having to go to the hospital. Pt admits to occasional marijuana. Heavy drinking but states he has been sober for about 4 weeks until last night.   Patient denies thoughts of harming others.  He denies symptoms of psychosis and no overt s/s are noted.      HISTORY OF PRESENT ILLNESS:       The patient is a 27 y.o. male graduate from [employer] with no significant past history of MDD     Pt seen therapist as teenager for emotional issues when he was at school. Since oblin time, he did not need any therapy or treatment. Siddharth Gan was the graduation day. Was sober from alcohol for a month. Was drinking most days and more so the weekend. Weekdays- strong beer 4 or more, more so over the weekend. Was drinking on and off since 23, not this much.  Started drinking this quantity from Dec 2021 until a month ago. Was stressed out doing grad school application, went home to [place] for xmas and started then which continued. During the period of drinking, he was feeling depressed, erratic sleep, aggressive, agitation, mood swings, anger irritability, impulsive  Has noticed all this since his adult life but has not sought any help. Depression hits him hard when he cannot do anything, then go into better mood. People wanted him to get help, but pt dismissed it saying that he want to graduate and then seek help. Family did not know about this either.      2 years ago, while heavily intoxicated, tried to load the gun, put it to head, put did not go when he clicked it. Father knew about it.      Split up with GF in May when he started drinking more, was suicidal, trying to jump off the window. Saw the counselor but was not taken to hospital. He was under the influence of alcohol     Quit drinking after this moment. Was doing good. Family were here for graduation party. Went to dinner Saturday night. Got a phone call from his ex GF, talked about some emotional things. Could not sleep, went to bar, had a few drinks, blacked out. Does not recall what he said, but he was suicidal     Majority of Collateral from Mother- Bearl Kehr. Patient had been attending [employer]. Patient also had been living for part of his time in college and using the practice [employer] was to have his graduation ceremony today. Mother lives in [address] [place] [address] Street has been drinking per mothers knowledge been binge drinking. He is aware she states that he may have a problem with alcohol . She states he has not been drinking daily and that last night was his first use of alcohol in three weeks. She states he has been counseling in [place] due to stressors of school and practice and his daily stressors.  He has made suicidal statements, but only when inebriated to her knowledge and states he has never attempted to harm himself in anyway to her awareness. He had been in a serious relationship with his girlfriend and had been living with her [place]. Patient is moving to [place] [employer]  Attend to the [employer]. His girlfriend movinf to another state to do the same. Mother believes that the change in their relationship and if would continue or not may have been addressed and contributed to patients mind set. She states if he would want to put off moving to [place] and take a break, that was the plan for the summer,she would welcome him to do so and that his father would likely due the same at one of their homes. She states she does not feel that he would need inpatient hospitalization. \" I feel like if anything happens to get in the way of his career path it would be bad for him, (emotionally).     Per step mother, patient has had complaints of suicidal ideations on multiple occasions. She states before today, most recent was on mothers days, where he threatened multiple things related to suicidal. She state about one year ago, he held a loaded gun up to his head and threatened suicide, which laso happened as a teen. She states tonight he made a specific statement that he drank so much as a plan to kill himself, and specifically threatened that \"if locked up, I'll still find a way to do it\". Step mother states he has never had IP care before. She states the bio mom is very much in denial and regularly interferes and tries to prevent him from going.  She states she really feel he needs IP care, as he wont get it on his own and other family doesn't support him doing so.     The patient is not currently receiving care for the above psychiatric illness.     Medications Prior to Admission:     Prescriptions Prior to Admission   Medications Prior to Admission: ondansetron (ZOFRAN ODT) 4 MG disintegrating tablet, Take 1 tablet by mouth every 8 hours as needed for Nausea  EPINEPHrine (EPIPEN 2-NANCY) 0.3 MG/0.3ML SOAJ injection, Inject 0.3 mLs into the muscle once for 1 dose Use as directed for allergic reaction  albuterol sulfate  (90 BASE) MCG/ACT inhaler, Inhale 2 puffs into the lungs every 6 hours as needed for Wheezing        Compliance:no     Psychiatric Review of Systems       Depression: yes     Mary Grace or Hypomania:  yes - as described      Panic Attacks:  no     Phobias:  no     Obsessions and Compulsions:  no     PTSD : no     Hallucinations:  no     Delusions:  no     Substance Abuse History:  ETOH: yes as above   Marijuana: no  Opiates: no  Other Drugs: no        Past Psychiatric History:  Prior Diagnosis:  alcoholism  Psychiatrist: no  Therapist:no  Hospitalization: no  Hx of Suicidal Attempts: yes  Hx of violence:  no  ECT: no  Previous discontinued Psychiatric Med Trials: no        PAST MEDICAL/PSYCHIATRIC HISTORY:   Past Medical History:   Diagnosis Date    Asthma        FAMILY/SOCIAL HISTORY:  History reviewed. No pertinent family history.   Social History     Socioeconomic History    Marital status: Single     Spouse name: Not on file    Number of children: Not on file    Years of education: Not on file    Highest education level: Not on file   Occupational History    Not on file   Tobacco Use    Smoking status: Current Every Day Smoker     Packs/day: 0.50     Types: Cigarettes    Smokeless tobacco: Never Used   Vaping Use    Vaping Use: Former   Substance and Sexual Activity    Alcohol use: Yes     Comment: occasional    Drug use: Not Currently     Types: Marijuana Norval Remak)     Comment: social    Sexual activity: Not on file   Other Topics Concern    Not on file   Social History Narrative    Not on file     Social Determinants of Health     Financial Resource Strain:     Difficulty of Paying Living Expenses: Not on file   Food Insecurity:     Worried About [address] in the Last Year: Not on file    Maxine of Food in the Last Year: Not on file   Transportation Needs:     Lack of Transportation (Medical): Not on file    Lack of Transportation (Non-Medical):  Not on file   Physical Activity:     Days of Exercise per Week: Not on file    Minutes of Exercise per Session: Not on file   Stress:     Feeling of Stress : Not on file   Social Connections:     Frequency of Communication with Friends and Family: Not on file    Frequency of Social Gatherings with Friends and Family: Not on file    Attends Orthodox Services: Not on file    Active Member of [address] [place] [address] or Organizations: Not on file    Attends Club or Organization Meetings: Not on file    Marital Status: Not on file   Intimate Partner Violence:     Fear of Current or Ex-Partner: Not on file    Emotionally Abused: Not on file    Physically Abused: Not on file    Sexually Abused: Not on file   Housing Stability:     Unable to Pay for Housing in the Last Year: Not on file    Number of [place] in the Last Year: Not on file    Unstable Housing in the Last Year: Not on file       MEDICATIONS:    Current Facility-Administered Medications:     melatonin tablet 6 mg, 6 mg, Oral, Nightly PRN, Jomar Lemus MD, 6 mg at 06/09/22 2052    EPINEPHrine 1 MG/ML injection 0.3 mg, 0.3 mg, IntraMUSCular, PRN, JABARI Weber NP    divalproex (DEPAKOTE ER) extended release tablet 500 mg, 500 mg, Oral, Nightly, Jomar Lemus MD, 500 mg at 06/09/22 2052    montelukast (SINGULAIR) tablet 10 mg, 10 mg, Oral, Nightly, JABARI Weber NP, 10 mg at 06/09/22 2052    nicotine polacrilex (COMMIT) lozenge 4 mg, 4 mg, Oral, Q2H PRN, Jomar Lemus MD, 4 mg at 06/10/22 0910    albuterol sulfate  (90 Base) MCG/ACT inhaler 2 puff, 2 puff, Inhalation, Q6H PRN, Jomar Lemus MD    acetaminophen (TYLENOL) tablet 650 mg, 650 mg, Oral, Q4H PRN, Jomar Lemus MD, 650 mg at 06/05/22 1719    magnesium hydroxide (MILK OF MAGNESIA) 400 MG/5ML suspension 30 mL, 30 mL, Oral, Daily PRN, Jomar Lemus MD    aluminum & magnesium hydroxide-simethicone (MAALOX) 200-200-20 MG/5ML suspension 30 mL, 30 mL, Oral, PRN, David Bishop MD    haloperidol (HALDOL) tablet 5 mg, 5 mg, Oral, Q6H PRN **OR** haloperidol lactate (HALDOL) injection 5 mg, 5 mg, IntraMUSCular, Q6H PRN, David Bishop MD    benztropine mesylate (COGENTIN) injection 2 mg, 2 mg, IntraMUSCular, BID PRN, David Bishop MD    hydrOXYzine pamoate (VISTARIL) capsule 50 mg, 50 mg, Oral, Q6H PRN, 50 mg at 06/08/22 1847 **OR** hydrOXYzine (VISTARIL) injection 50 mg, 50 mg, IntraMUSCular, Q6H PRN, David Bishop MD    Examination:  /73   Pulse 69   Temp 98.6 °F (37 °C) (Oral)   Resp 20   Ht 5' 9\" (1.753 m)   Wt 151 lb (68.5 kg)   SpO2 100%   BMI 22.30 kg/m²   Gait - steady    HOSPITAL COURSE[de-identified]  Following admission to the hospital, patient had a complete physical exam and blood work up  Patient was monitored closely with suicide precaution  Patient was started on medication as listed below  Was encouraged to participate in group and other milieu activity  Patient started to feel better with this combination of treatment. Significant progress in the symptoms since admission. Mood better, with the score of 2/10 - bad  No AVH or paranoid thoughts  No Hopeless or worthless feeling  No active SI/HI  Appetite:  [x] Normal  [] Increased  [] Decreased    Sleep:       [x] Normal  [] Fair       [] Poor            Energy:    [x] Normal  [] Increased  [] Decreased     SI [] Present  [x] Absent  HI  []Present  [x] Absent   Aggression:  [] yes  [] no  Patient is [x] able  [] unable to CONTRACT FOR SAFETY   Medication side effects(SE):  [x] None(Psych.  Meds.) [] Other      Mental Status Examination on discharge:    Level of consciousness:  within normal limits   Appearance:  well-appearing  Behavior/Motor:  no abnormalities noted  Attitude toward examiner:  attentive and good eye contact  Speech:  spontaneous, normal rate and normal volume   Mood: euthymic  Affect:  mood congruent  Thought processes:  linear   Thought content:  Suicidal Ideation:  denies suicidal ideation  Cognition:  oriented to person, place, and time   Concentration intact  Memory intact  Insight good   Judgement fair   Fund of Knowledge adequate      ASSESSMENT:  Patient symptoms are:  [x] Well controlled  [x] Improving  [] Worsening  [] No change      Diagnosis:  Bipolar depression    LABS:    No results for input(s): WBC, HGB, PLT in the last 72 hours. No results for input(s): NA, K, CL, CO2, BUN, CREATININE, GLUCOSE in the last 72 hours. No results for input(s): BILITOT, ALKPHOS, AST, ALT in the last 72 hours. Lab Results   Component Value Date    LABAMPH Neg 06/05/2022    BARBSCNU Neg 06/05/2022    LABBENZ Neg 06/05/2022    LABMETH Neg 06/05/2022    OPIATESCREENURINE Neg 06/05/2022    PHENCYCLIDINESCREENURINE Neg 06/05/2022    ETOH 113 06/05/2022     Lab Results   Component Value Date    TSH 0.458 06/05/2022     No results found for: LITHIUM  Lab Results   Component Value Date    VALPROATE 23.2 (L) 06/10/2022       RISK ASSESSMENT AT DISCHARGE: Low risk for suicide and homicide. Treatment Plan:  Reviewed current Medications with the patient. Education provided on the complaince with treatment. Risks, benefits, side effects, drug-to-drug interactions and alternatives to treatment were discussed. Encourage patient to attend outpatient follow up appointment and therapy. Patient was advised to call the outpatient provider, visit the nearest ED or call 911 if symptoms are not manageable. Patient's family member was contacted prior to the discharge.          Medication List      START taking these medications    divalproex 500 MG extended release tablet  Commonly known as: DEPAKOTE ER  Take 1 tablet by mouth at bedtime     melatonin 3 mg Tabs tablet  Take 2 tablets by mouth nightly as needed (sleep)        CONTINUE taking these medications    albuterol sulfate  (90 Base) MCG/ACT inhaler  Commonly known as: PROVENTIL;VENTOLIN;PROAIR     EPINEPHrine 0.3 MG/0.3ML Soaj injection  Commonly known as: EpiPen 2-Carlos  Inject 0.3 mLs into the muscle once for 1 dose Use as directed for allergic reaction        STOP taking these medications    ondansetron 4 MG disintegrating tablet  Commonly known as: Zofran ODT           Where to Get Your Medications      You can get these medications from any pharmacy    Bring a paper prescription for each of these medications  · divalproex 500 MG extended release tablet  · melatonin 3 mg Tabs tablet           Reason for more than one antipsychotic:   [x] N/A  [] 3 failed monotherapy(drugs tried):  [] Cross over to a new antipsychotic  [] Taper to monotherapy from polypharmacy  [] Augmentation of Clozapine therapy due to treatment resistance to single therapy        TIME SPEND - 35 MINUTES TO COMPLETE THE EVALUATION, DISCHARGE SUMMARY, MEDICATION RECONCILIATION AND FOLLOW UP CARE     Signed:  Jayna Schumacher MD  6/10/2022  9:38 AM

## 2022-06-10 NOTE — GROUP NOTE
Group Therapy Note    Date: 6/9/2022    Group Start Time: 1300  Group End Time: 2983  Group Topic: Music Therapy    [facility] YOLANDA Huizar, [facility]        Group Therapy Note    Attendees: 9         Patient's Goal:  To participate in music therapy    Notes:  Patient played an instrument    Status After Intervention:  Improved    Participation Level: Interactive    Participation Quality: Appropriate      Speech:  normal      Thought Process/Content: Logical      Affective Functioning: Congruent      Mood: anxious      Level of consciousness:  Alert      Response to Learning: Progressing to goal      Endings: None Reported    Modes of Intervention: Support      Discipline Responsible: /Counselor      Signature:  Howard Huizar, [facility]

## 2022-06-10 NOTE — GROUP NOTE
Group Therapy Note    Date: 6/9/2022    Group Start Time: 2030  Group End Time: 2040  Group Topic: Wrap-Up    [facility]    Christofer Richey        Group Therapy Note    Attendees: 9/16         Patient's Goal:  *\"be more in the moment\"    Notes:  Patient reported meeting their goal for the day. Patient shared he talked to a friend on the phone today.     Status After Intervention:  Unchanged    Participation Level: Interactive    Participation Quality: Appropriate, Attentive and Sharing      Speech:  normal      Thought Process/Content: Logical      Affective Functioning: Congruent      Mood: euthymic      Level of consciousness:  Alert and Attentive      Response to Learning: Progressing to goal      Endings: None Reported    Modes of Intervention: Support      Discipline Responsible: [employer]      Signature:  Christofer Richey

## 2022-06-10 NOTE — GROUP NOTE
Group Therapy Note    Date: 6/10/2022    Group Start Time: 1000  Group End Time: 1050  Group Topic: Psychoeducation    [facility]    Lashawn Mazariegos        Group Therapy Note    Attendees: 12         Patient's Goal:  \"To have a successful discharge\"    Notes:  Patient attended the 1000 skills group. Patient was more animated, he was excited about his discharge and he worked well in group. Status After Intervention:  Improved    Participation Level:  Active Listener    Participation Quality: Appropriate and Attentive      Speech:  normal      Thought Process/Content: Logical  Linear      Affective Functioning: Congruent      Mood: calm      Level of consciousness:  Alert      Response to Learning: Able to retain information      Endings: None Reported    Modes of Intervention: Education, Socialization and Activity      Discipline Responsible: Psychoeducational Specialist      Signature:  Lashawn Mazariegos

## 2022-06-13 NOTE — GROUP NOTE
Group Therapy Note    Date: 6/8/2022    Group Start Time: 1100  Group End Time: 4186  Group Topic: Psychotherapy    [facility] DANIELI    Silvia Banuelos, [facility]        Group Therapy Note    Attendees: 9         Patient's Goal:  To learn coping skills    Notes:  Patient participated    Status After Intervention:  Improved    Participation Level: Interactive    Participation Quality: Attentive      Speech:  normal      Thought Process/Content: Logical      Affective Functioning: Congruent      Mood: anxious      Level of consciousness:  Alert      Response to Learning: Progressing to goal      Endings: None Reported    Modes of Intervention: Support      Discipline Responsible: /Counselor      Signature:  Zenaida Mesa, [facility]